# Patient Record
Sex: MALE | Race: WHITE | NOT HISPANIC OR LATINO | Employment: FULL TIME | URBAN - METROPOLITAN AREA
[De-identification: names, ages, dates, MRNs, and addresses within clinical notes are randomized per-mention and may not be internally consistent; named-entity substitution may affect disease eponyms.]

---

## 2017-03-06 ENCOUNTER — TRANSFERRED RECORDS (OUTPATIENT)
Dept: HEALTH INFORMATION MANAGEMENT | Facility: CLINIC | Age: 67
End: 2017-03-06

## 2017-04-07 ENCOUNTER — TRANSFERRED RECORDS (OUTPATIENT)
Dept: HEALTH INFORMATION MANAGEMENT | Facility: CLINIC | Age: 67
End: 2017-04-07

## 2017-05-12 ENCOUNTER — TRANSFERRED RECORDS (OUTPATIENT)
Dept: HEALTH INFORMATION MANAGEMENT | Facility: CLINIC | Age: 67
End: 2017-05-12

## 2017-09-07 ENCOUNTER — TRANSFERRED RECORDS (OUTPATIENT)
Dept: HEALTH INFORMATION MANAGEMENT | Facility: CLINIC | Age: 67
End: 2017-09-07

## 2018-08-02 ENCOUNTER — TRANSFERRED RECORDS (OUTPATIENT)
Dept: HEALTH INFORMATION MANAGEMENT | Facility: CLINIC | Age: 68
End: 2018-08-02

## 2018-10-29 ENCOUNTER — TRANSFERRED RECORDS (OUTPATIENT)
Dept: HEALTH INFORMATION MANAGEMENT | Facility: CLINIC | Age: 68
End: 2018-10-29

## 2020-05-21 ENCOUNTER — TRANSFERRED RECORDS (OUTPATIENT)
Dept: HEALTH INFORMATION MANAGEMENT | Facility: CLINIC | Age: 70
End: 2020-05-21

## 2020-07-09 ENCOUNTER — TRANSFERRED RECORDS (OUTPATIENT)
Dept: HEALTH INFORMATION MANAGEMENT | Facility: CLINIC | Age: 70
End: 2020-07-09

## 2021-07-12 ENCOUNTER — TRANSFERRED RECORDS (OUTPATIENT)
Dept: HEALTH INFORMATION MANAGEMENT | Facility: CLINIC | Age: 71
End: 2021-07-12

## 2022-06-20 ENCOUNTER — TRANSFERRED RECORDS (OUTPATIENT)
Dept: MULTI SPECIALTY CLINIC | Facility: CLINIC | Age: 72
End: 2022-06-20

## 2022-08-24 ENCOUNTER — TRANSFERRED RECORDS (OUTPATIENT)
Dept: HEALTH INFORMATION MANAGEMENT | Facility: CLINIC | Age: 72
End: 2022-08-24

## 2023-05-30 ENCOUNTER — TRANSFERRED RECORDS (OUTPATIENT)
Dept: HEALTH INFORMATION MANAGEMENT | Facility: CLINIC | Age: 73
End: 2023-05-30

## 2023-06-02 ENCOUNTER — TRANSFERRED RECORDS (OUTPATIENT)
Dept: HEALTH INFORMATION MANAGEMENT | Facility: CLINIC | Age: 73
End: 2023-06-02

## 2023-09-07 ENCOUNTER — TRANSFERRED RECORDS (OUTPATIENT)
Dept: HEALTH INFORMATION MANAGEMENT | Facility: CLINIC | Age: 73
End: 2023-09-07

## 2024-02-21 ENCOUNTER — TRANSFERRED RECORDS (OUTPATIENT)
Dept: HEALTH INFORMATION MANAGEMENT | Facility: CLINIC | Age: 74
End: 2024-02-21

## 2024-03-26 ENCOUNTER — TELEPHONE (OUTPATIENT)
Dept: INTERNAL MEDICINE | Facility: CLINIC | Age: 74
End: 2024-03-26
Payer: MEDICARE

## 2024-03-26 NOTE — TELEPHONE ENCOUNTER
Reason for Call:  Appointment Request    Patient requesting this type of appt:  Preventive     Requested provider:  New PCP    Reason patient unable to be scheduled: Not within requested timeframe    When does patient want to be seen/preferred time:  Pt needs new PCP he just moved to [place]    Comments:   Pt found Melissa Garcia and is interested in having her as his PCP Please contact pt to discuss  Okay to leave a detailed message?: Yes at Cell number on file:    Telephone Information:   Mobile [phone]       Call taken on 3/26/2024 at 4:14 PM by Judy Alexander

## 2024-04-02 NOTE — TELEPHONE ENCOUNTER
I spoke to patient, he is aware that Dr. Melissa Garcia only see patient's for memory care. She is no longer a primary care provider

## 2024-05-19 ENCOUNTER — HEALTH MAINTENANCE LETTER (OUTPATIENT)
Age: 74
End: 2024-05-19

## 2024-06-10 ENCOUNTER — OFFICE VISIT (OUTPATIENT)
Dept: FAMILY MEDICINE | Facility: CLINIC | Age: 74
End: 2024-06-10
Payer: MEDICARE

## 2024-06-10 VITALS
SYSTOLIC BLOOD PRESSURE: 148 MMHG | TEMPERATURE: 99 F | RESPIRATION RATE: 16 BRPM | DIASTOLIC BLOOD PRESSURE: 76 MMHG | OXYGEN SATURATION: 96 % | HEIGHT: 69 IN | HEART RATE: 81 BPM | BODY MASS INDEX: 25.77 KG/M2 | WEIGHT: 174 LBS

## 2024-06-10 DIAGNOSIS — I10 ESSENTIAL HYPERTENSION: ICD-10-CM

## 2024-06-10 DIAGNOSIS — Z93.3 COLOSTOMY IN PLACE (H): ICD-10-CM

## 2024-06-10 DIAGNOSIS — R73.03 PREDIABETES: Primary | ICD-10-CM

## 2024-06-10 DIAGNOSIS — F51.01 PRIMARY INSOMNIA: ICD-10-CM

## 2024-06-10 DIAGNOSIS — I25.10 CORONARY ARTERY DISEASE INVOLVING NATIVE CORONARY ARTERY OF NATIVE HEART WITHOUT ANGINA PECTORIS: ICD-10-CM

## 2024-06-10 DIAGNOSIS — K63.5 POLYP OF COLON, UNSPECIFIED PART OF COLON, UNSPECIFIED TYPE: ICD-10-CM

## 2024-06-10 DIAGNOSIS — N52.9 ERECTILE DYSFUNCTION, UNSPECIFIED ERECTILE DYSFUNCTION TYPE: ICD-10-CM

## 2024-06-10 LAB — HBA1C MFR BLD: 5.8 % (ref 0–5.6)

## 2024-06-10 PROCEDURE — 80048 BASIC METABOLIC PNL TOTAL CA: CPT | Performed by: INTERNAL MEDICINE

## 2024-06-10 PROCEDURE — 36415 COLL VENOUS BLD VENIPUNCTURE: CPT | Performed by: INTERNAL MEDICINE

## 2024-06-10 PROCEDURE — 99204 OFFICE O/P NEW MOD 45 MIN: CPT | Performed by: INTERNAL MEDICINE

## 2024-06-10 PROCEDURE — G2211 COMPLEX E/M VISIT ADD ON: HCPCS | Performed by: INTERNAL MEDICINE

## 2024-06-10 PROCEDURE — 80061 LIPID PANEL: CPT | Performed by: INTERNAL MEDICINE

## 2024-06-10 PROCEDURE — 83036 HEMOGLOBIN GLYCOSYLATED A1C: CPT | Performed by: INTERNAL MEDICINE

## 2024-06-10 RX ORDER — UBIDECARENONE 30 MG
1 CAPSULE ORAL DAILY
COMMUNITY

## 2024-06-10 RX ORDER — ELECTROLYTES/DEXTROSE
SOLUTION, ORAL ORAL DAILY
COMMUNITY

## 2024-06-10 RX ORDER — VITAMIN B COMPLEX
1 TABLET ORAL
COMMUNITY

## 2024-06-10 RX ORDER — ATORVASTATIN CALCIUM 40 MG/1
40 TABLET, FILM COATED ORAL DAILY
COMMUNITY

## 2024-06-10 RX ORDER — ASPIRIN 81 MG/1
81 TABLET, CHEWABLE ORAL DAILY
COMMUNITY

## 2024-06-10 RX ORDER — CLOPIDOGREL BISULFATE 75 MG/1
75 TABLET ORAL DAILY
COMMUNITY

## 2024-06-10 RX ORDER — RESPIRATORY SYNCYTIAL VIRUS VACCINE 120MCG/0.5
0.5 KIT INTRAMUSCULAR ONCE
Qty: 1 EACH | Refills: 0 | Status: CANCELLED | OUTPATIENT
Start: 2024-06-10 | End: 2024-06-10

## 2024-06-10 RX ORDER — CHLORTHALIDONE 25 MG/1
25 TABLET ORAL DAILY
COMMUNITY

## 2024-06-10 RX ORDER — SILDENAFIL 100 MG/1
50-100 TABLET, FILM COATED ORAL DAILY PRN
Qty: 30 TABLET | Refills: 0 | Status: SHIPPED | OUTPATIENT
Start: 2024-06-10

## 2024-06-10 RX ORDER — METOPROLOL TARTRATE 25 MG/1
25 TABLET, FILM COATED ORAL ONCE
COMMUNITY
Start: 2023-07-13 | End: 2024-07-22

## 2024-06-10 RX ORDER — TRAZODONE HYDROCHLORIDE 50 MG/1
50 TABLET, FILM COATED ORAL
COMMUNITY
Start: 2023-09-07

## 2024-06-10 ASSESSMENT — PAIN SCALES - GENERAL: PAINLEVEL: NO PAIN (0)

## 2024-06-10 NOTE — PROGRESS NOTES
Assessment & Plan     Pleasant patient who relocated from [facility] just a couple months ago to be closer to family.  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  Here to establish care.  Will place appropriate referrals as well.    He was getting most of his care through [facility].    Prediabetes  By outside records.  Not on medication.  Check an A1c  - Lipid panel reflex to direct LDL Non-fasting  - Hemoglobin A1c  - Basic metabolic panel  (Ca, Cl, CO2, Creat, Gluc, K, Na, BUN)  - Lipid panel reflex to direct LDL Non-fasting  - Hemoglobin A1c  - Basic metabolic panel  (Ca, Cl, CO2, Creat, Gluc, K, Na, BUN)    Primary insomnia  He is on trazodone.  Medications noted.  Refills not requested today.    Essential hypertension  A bit on the high side.  Will check labs.  Check blood pressure at home and report elevated readings.  Return to clinic in 3 months to monitor blood pressure.  - Basic metabolic panel  (Ca, Cl, CO2, Creat, Gluc, K, Na, BUN)  - Basic metabolic panel  (Ca, Cl, CO2, Creat, Gluc, K, Na, BUN)    Coronary artery disease involving native coronary artery of native heart without angina pectoris  Patient had CABG in 2020.  Medications are noted.  Needs to establish with local cardiology.  Referral was entered.  - Lipid panel reflex to direct LDL Non-fasting  - Adult Cardiology aRghavendra Stanford Referral  - Lipid panel reflex to direct LDL Non-fasting    Polyp of colon, unspecified part of colon, unspecified type  He will be due for colonoscopy this fall.  He does have a history of rectal cancer.  Surveillance colonoscopy is indicated.  Orders entered.  - Adult GI  Referral - Procedure Only    Erectile dysfunction, unspecified erectile dysfunction type  He has a significant other [place].  He would like to try sildenafil.  He has had this with success in the past.  Risk and side effects discussed.  - sildenafil (VIAGRA) 100 MG tablet  Dispense: 30 tablet; Refill: 0    Ostomy:  He  "has an ileostomy.  He made a personal decision to keep the ileostomy in place due to his career and needing to use the bathroom every so often.  He is now cut back on work a little bit.  He may be interested in pursuing ileostomy takedown at some point in the future.        49 minutes spent by me on the date of the encounter doing chart review, review of outside records, interpretation of tests, patient visit, and documentation     BMI  Estimated body mass index is 25.7 kg/m  as calculated from the following:    Height as of this encounter: 1.753 m (5' 9\").    Weight as of this encounter: 78.9 kg (174 lb).           Ro Ramirez is a 74 year old, presenting for the following health issues:  Establish Care (Recently moved from [place], [place] seen at [facility] )        6/10/2024    12:59 PM   Additional Questions   Roomed by Vida     Via the Health Maintenance questionnaire, the patient has reported the following services have been completed -Colonscopy: [place], [place]. 2022-05-15, this information has been sent to the abstraction team.    Pt is new to me as well as the system.  Recently relocated from [facility].  Son in Law is an orthopedic Surgeon at [employer]. Came here to be closer to family.      CAD:  Had CABG in 7/2020.    Medications noted.     Cancer:  Rectal.  Was seeing Dr. Tomas Lou at [facility] in [place].  Dx 2017.  Had chemoradiation.  Has ostomy. Was told occasional CEA lab for the next 1-2 years but nothing after or in addition.      He has ileostomy.  Has chosen to keep it as he is in the music business and felt that frequent BM were not conducive to his work life.      Colon polyps:  On C scope fall 2023.          History of Present Illness       Vascular Disease:  He presents for follow up of vascular disease.     He never takes nitroglycerin. He takes daily aspirin.    He eats 2-3 servings of fruits and vegetables daily.He consumes 0 sweetened beverage(s) daily.He " "exercises with enough effort to increase his heart rate 20 to 29 minutes per day.  He exercises with enough effort to increase his heart rate 4 days per week.   He is taking medications regularly.             Objective    BP (!) 148/76   Pulse 81   Temp 99  F (37.2  C) (Temporal)   Resp 16   Ht 1.753 m (5' 9\")   Wt 78.9 kg (174 lb)   SpO2 96%   BMI 25.70 kg/m    Body mass index is 25.7 kg/m .  Physical Exam   GENERAL: alert and no distress  NECK: no adenopathy, no asymmetry, masses, or scars  RESP: lungs clear to auscultation - no rales, rhonchi or wheezes  CV: regular rate and rhythm, normal S1 S2, no S3 or S4, no murmur, click or rub, no peripheral edema  ABDOMEN: soft, nontender, no hepatosplenomegaly, no masses and bowel sounds normal  MS: no gross musculoskeletal defects noted, no edema            Signed Electronically by: Timur Bustos MD    "

## 2024-06-10 NOTE — PATIENT INSTRUCTIONS
LABS now    We placed a referral to Cardiology to establish care.  They will call you to schedule.    We placed a referral for a Colonoscopy.  They'll call you.  Get it in November    See me in 3 months

## 2024-06-11 LAB
ANION GAP SERPL CALCULATED.3IONS-SCNC: 11 MMOL/L (ref 7–15)
BUN SERPL-MCNC: 15.2 MG/DL (ref 8–23)
CALCIUM SERPL-MCNC: 9.9 MG/DL (ref 8.8–10.2)
CHLORIDE SERPL-SCNC: 97 MMOL/L (ref 98–107)
CHOLEST SERPL-MCNC: 201 MG/DL
CREAT SERPL-MCNC: 0.81 MG/DL (ref 0.67–1.17)
DEPRECATED HCO3 PLAS-SCNC: 27 MMOL/L (ref 22–29)
EGFRCR SERPLBLD CKD-EPI 2021: >90 ML/MIN/1.73M2
FASTING STATUS PATIENT QL REPORTED: NO
FASTING STATUS PATIENT QL REPORTED: NO
GLUCOSE SERPL-MCNC: 76 MG/DL (ref 70–99)
HDLC SERPL-MCNC: 62 MG/DL
LDLC SERPL CALC-MCNC: 82 MG/DL
NONHDLC SERPL-MCNC: 139 MG/DL
POTASSIUM SERPL-SCNC: 3.6 MMOL/L (ref 3.4–5.3)
SODIUM SERPL-SCNC: 135 MMOL/L (ref 135–145)
TRIGL SERPL-MCNC: 286 MG/DL

## 2024-06-18 ENCOUNTER — TELEPHONE (OUTPATIENT)
Dept: GASTROENTEROLOGY | Facility: CLINIC | Age: 74
End: 2024-06-18
Payer: MEDICARE

## 2024-06-18 NOTE — TELEPHONE ENCOUNTER
"Endoscopy Scheduling Screen    Have you had a positive Covid test in the last 14 days?  No      What is your communication preference for Instructions and/or Bowel Prep?   MyChart -  & EMAIL WHEN POSSIBLE      What insurance is in the chart?  Other:  MEDICARE/AARP    Ordering/Referring Provider: HAY PACE   (If ordering provider performs procedure, schedule with ordering provider unless otherwise instructed. )    BMI: Estimated body mass index is 25.7 kg/m  as calculated from the following:    Height as of 6/10/24: 1.753 m (5' 9\").    Weight as of 6/10/24: 78.9 kg (174 lb).     Sedation Ordered  moderate sedation.   If patient BMI > 50 do not schedule in [facility].    If patient BMI > 45 do not schedule at [facility].    Are you taking methadone or Suboxone?  No    Have you had difficulties, pain, or discomfort during past endoscopy procedures? NORMALLY NOT SEDATED   No    Are you taking any prescription medications for pain 3 or more times per week?   NO, No RN review required.      Do you have a history of malignant hyperthermia?  No      (Females) Are you currently pregnant?          Have you been diagnosed or told you have pulmonary hypertension?   No      Do you have an LVAD?  No      Have you been told you have moderate to severe sleep apnea?  No    Have you been told you have COPD, asthma, or any other lung disease?  No      Do you have any heart conditions?  Yes -  HX of bypass    In the past year, have you had any hospitalizations for heart related issues including cardiomyopathy, heart attack, or stent placement?  No    Do you have any implantable devices in your body (pacemaker, ICD)?  No    Do you take nitroglycerine?  No      Have you ever had or are you waiting for an organ transplant?  No. Continue scheduling, no site restrictions.      Have you had a stroke or transient ischemic attack (TIA aka \"mini stroke\" in the last 6 months?   No      Have you been diagnosed with or been told you have cirrhosis of the " "liver?   No      Are you currently on dialysis?   No      Do you need assistance transferring?   No    BMI: Estimated body mass index is 25.7 kg/m  as calculated from the following:    Height as of 6/10/24: 1.753 m (5' 9\").    Weight as of 6/10/24: 78.9 kg (174 lb).     Is patients BMI > 40 and scheduling location UP?  No      Do you take an injectable medication for weight loss or diabetes (excluding insulin)?  No    Do you take the medication Naltrexone?  No    Do you take blood thinners?  Yes      Are you taking Effient/Prasugrel?  No, you must contact your prescribing provider for direction on holding or bridging with a different medication.       Prep   Are you currently on dialysis or do you have chronic kidney disease?  No    Do you have a diagnosis of diabetes?  No    Do you have a diagnosis of cystic fibrosis (CF)?  No    On a regular basis do you go 3 -5 days between bowel movements?  No    BMI > 40?  No    Preferred Pharmacy:    [id number] [address] [place] [address] [place] [address]  Phone: [phone] Fax: [phone]      Final Scheduling Details     Procedure scheduled  Colonoscopy    Surgeon:  RAMON     Date of procedure:  9/17/24     Pre-OP / PAC:   No - Not required for this site.    Location  SH - Patient preference.    Sedation   No Sedation - Patient preference.      Patient Reminders:   You will receive a call from a Nurse to review instructions and health history.  This assessment must be completed prior to your procedure.  Failure to complete the Nurse assessment may result in the procedure being cancelled.      On the day of your procedure, please designate an adult(s) who can drive you home stay with you for the next 24 hours. The medicines used in the exam will make you sleepy. You will not be able to drive.      You cannot take public transportation, ride share services, or non-medical taxi service without a " responsible caregiver.  Medical transport services are allowed with the requirement that a responsible caregiver will receive you at your destination.  We require that drivers and caregivers are confirmed prior to your procedure.

## 2024-06-20 ENCOUNTER — TRANSFERRED RECORDS (OUTPATIENT)
Dept: HEALTH INFORMATION MANAGEMENT | Facility: CLINIC | Age: 74
End: 2024-06-20
Payer: MEDICARE

## 2024-07-08 ENCOUNTER — MYC MEDICAL ADVICE (OUTPATIENT)
Dept: FAMILY MEDICINE | Facility: CLINIC | Age: 74
End: 2024-07-08
Payer: MEDICARE

## 2024-07-08 DIAGNOSIS — Z93.3 COLOSTOMY IN PLACE (H): Primary | ICD-10-CM

## 2024-07-08 NOTE — TELEPHONE ENCOUNTER
TO PCP:     See below, pt asking for your advice regarding having ileostomy reversal procedure done     Please advise     Elisha PEREYRA, Triage RN  [facility]

## 2024-07-12 NOTE — TELEPHONE ENCOUNTER
Diagnosis, Referred by & from: Discuss Ileostomy Takedown   Appt date: 9/24/2024   NOTES STATUS DETAILS   OFFICE NOTE from referring provider Internal [place] [facility] [place]:  7/8/24 - MyChart referral from Dr. Bustos  6/10/24 - PCC OV with Dr. Bustos   OFFICE NOTE from other specialist Care Everywhere [facility]:  2/21/24, 2/22/23 - ONC OV with Dr. Lou  3/2/23 - GI OV with Dr. Heart  7/9/20 - CR OV with Dr. Cohen  9/6/17 - WOUND OV with Raman Cian RN  7/12/17 - RAD ONC OV with Dr. Pepe   DISCHARGE SUMMARY from hospital Care Everywhere [facility]:  8/31/17 - Admission with Dr. Day   DISCHARGE REPORT from the ER N/A    OPERATIVE REPORT Care [facility]where [facility]:  8/31/17 - OP Note for Robot assisted lap Low anterior resection with end colosotomy (Leander's) and flexible sigmoidoscopy with Dr. Cohen ([facility])   MEDICATION LIST Internal    LABS     ANAL PAP/CEA Care Everywhere [facility]:  2/21/24 - CEA   BIOPSIES/PATHOLOGY RELATED TO DIAGNOSIS Care [facility]where [facility]:  6/20/22 - Colon Biopsy (Case: [id number])  8/31/17 - Rectum (Case: [id number])  4/11/17 - Rectal (case: [id number])  3/6/17 - Colon Biopsy (Case: [id number])   DIAGNOSTIC PROCEDURES     PFC TESTING (from the Pelvic floor center includes Manometry, PDNL, EMG, etc.) N/A    COLONOSCOPY (most recent all time after 5 years) Care [facility]where [facility]:  ([facility]) 9/17/24 - Colonoscopy    [facility]:  6/20/22 - Colonoscopy  10/29/18 - Colonoscopy  3/6/17 - Colonoscopy   FLEX SIGMOIDOSCOPY Care Everywhere [facility]:  7/17/17 - Flexible Sigmoidoscopy   UPPER ENDOSCOPY (EGD) Care [facility]where [facility]:  5/30/23 - EGD   ERCP N/A    IMAGING (DISC & REPORT)      CT Received [facility]:  8/24/22 - CT Chest/Abd/pelvis  7/12/21 - CT Chest/Abd/Pelvis  5/21/20 - CT Chest/Abd/pelvis  8/28/18 - CT Chest/Abd/Pelvis   MRI N/A    XRAY N/A    ULTRASOUND  (ENDOANAL/ENDORECTAL) N/A      Records Requested  07/12/24     Facility  [facility]  Fax: [phone]   Outcome * 24 11:03 AM Faxed request to [facility] for imaging disc to be fed'exd to the clinic. - [facility]    * 24 7:07 AM Imaging disc received from [facility] and sent to [facility] include 234 beds at the [facility] to be uploaded into PACs. - [place]    * 24 1:15 PM Imaging disc received from [facility] and sent to [facility] include 234 beds at the [facility] to be uploaded into PACs. - [facility]    Trackin

## 2024-07-16 ENCOUNTER — OFFICE VISIT (OUTPATIENT)
Dept: CARDIOLOGY | Facility: CLINIC | Age: 74
End: 2024-07-16
Attending: INTERNAL MEDICINE
Payer: MEDICARE

## 2024-07-16 VITALS
DIASTOLIC BLOOD PRESSURE: 83 MMHG | HEART RATE: 68 BPM | SYSTOLIC BLOOD PRESSURE: 137 MMHG | OXYGEN SATURATION: 95 % | HEIGHT: 69 IN | WEIGHT: 174.3 LBS | BODY MASS INDEX: 25.82 KG/M2

## 2024-07-16 DIAGNOSIS — I25.10 CORONARY ARTERY DISEASE INVOLVING NATIVE CORONARY ARTERY OF NATIVE HEART WITHOUT ANGINA PECTORIS: ICD-10-CM

## 2024-07-16 PROCEDURE — 99204 OFFICE O/P NEW MOD 45 MIN: CPT | Performed by: INTERNAL MEDICINE

## 2024-07-16 PROCEDURE — 93000 ELECTROCARDIOGRAM COMPLETE: CPT | Performed by: INTERNAL MEDICINE

## 2024-07-16 NOTE — LETTER
7/16/2024    Timur Bustos MD  [address] [place] [address]    RE: James Kebede       Dear Colleague,     I had the pleasure of seeing James Kebede in the [facility].  HPI and Plan:   James Kebede is a 74 year old male who presents with history of coronary artery disease and four-vessel CABG in 2020 in [facility].  He had a LIMA graft to an LAD, vein graft to OM1 and sequential to OM 2, and a vein graft to diagonal branch.  He has preserved LV systolic function on echo last year EF was 69% with some mild left ventricular hypertrophy noted no significant valve disease.  He has recently moved to [place], although he travels a lot for his job.  He is here to establish care.  Sounds like he had significant chest discomfort with his heart attack prior to his bypass.  He has not had any chest discomfort since and he is stays very active and enjoys bicycling for exercise.  He is also had colon cancer and had a partial colectomy, and has an ileostomy.  He tells me his oncologist has discharged him cancer free.  He had blood work drawn in June including a hemoglobin A1c demonstrating borderline diabetes at 5.8, basic metabolic panel was normal and total cholesterol 201 HDL 62 LDL 82 and triglycerides 286 on moderate intensity atorvastatin.    Exam today is unremarkable    Summary    1.  Coronary artery disease with previous four-vessel CABG and preserved LV systolic function-he remains asymptomatic on optimal medical therapy, regular aerobic exercise and maintains a vegetarian diet.  He is on dual antiplatelet therapy because of history of large and ectatic coronary vessels.  I do agree with this however we do need to be cautious with long-term DAPT, which increases bleeding risks.    2.  Hyperlipidemia-she still has mild hypertriglyceridemia despite moderate intensity atorvastatin diet regular aerobic exercise, weight maintenance and avoidance of highly processed, sugary or  fatty foods and alcohol recommended.    3.  Borderline diabetes-diet controlled at this time, continue to work with PMD on blood sugar control    Please feel free to contact me with any questions given regards to his care         Today's clinic visit entailed:  Review of external notes as documented elsewhere in note  Review of the result(s) of each unique test - BMP, lipids, CBC, HgbA1c    Provider  Link to MDM Help Grid     The level of medical decision making during this visit was of moderate complexity.    Orders Placed This Encounter   Procedures    EKG 12-lead complete w/read - Clinics (performed today)     No orders of the defined types were placed in this encounter.    There are no discontinued medications.      Encounter Diagnosis   Name Primary?    Coronary artery disease involving native coronary artery of native heart without angina pectoris        CURRENT MEDICATIONS:  Current Outpatient Medications   Medication Sig Dispense Refill    aspirin (ASA) 81 MG chewable tablet Take 81 mg by mouth daily      atorvastatin (LIPITOR) 40 MG tablet Take 40 mg by mouth daily      chlorthalidone (HYGROTON) 25 MG tablet Take 25 mg by mouth daily      clopidogrel (PLAVIX) 75 MG tablet Take 75 mg by mouth daily      coenzyme Q-10 capsule Take 1 capsule by mouth daily      metoprolol tartrate (LOPRESSOR) 25 MG tablet Take 25 mg by mouth once      Multiple Vitamin (MULTIVITAMIN ADULT) TABS daily      sildenafil (VIAGRA) 100 MG tablet Take 0.5-1 tablets ( mg) by mouth daily as needed (prior to intercourse) 30 tablet 0    traZODone (DESYREL) 50 MG tablet Take 50 mg by mouth nightly as needed      vitamin B complex with vitamin C (VITAMIN  B COMPLEX) tablet Take 1 tablet by mouth twice a week      Vitamin D3 (CHOLECALCIFEROL) 25 mcg (1000 units) tablet Take 1 tablet by mouth twice a week         ALLERGIES   No Known Allergies    PAST MEDICAL HISTORY:  History reviewed. No pertinent past medical history.    PAST  SURGICAL HISTORY:  History reviewed. No pertinent surgical history.    FAMILY HISTORY:  History reviewed. No pertinent family history.    SOCIAL HISTORY:  Social History     Socioeconomic History    Marital status: Single     Spouse name: None    Number of children: None    Years of education: None    Highest education level: None   Tobacco Use    Smoking status: Former     Types: Cigarettes    Smokeless tobacco: Never   Vaping Use    Vaping status: Never Used   Substance and Sexual Activity    Alcohol use: Not Currently     Social Determinants of Health     Financial Resource Strain: Low Risk  (6/10/2024)    Financial Resource Strain     Within the past 12 months, have you or your family members you live with been unable to get utilities (heat, electricity) when it was really needed?: No   Food Insecurity: Low Risk  (6/10/2024)    Food Insecurity     Within the past 12 months, did you worry that your food would run out before you got money to buy more?: No     Within the past 12 months, did the food you bought just not last and you didn t have money to get more?: No   Transportation Needs: Low Risk  (6/10/2024)    Transportation Needs     Within the past 12 months, has lack of transportation kept you from medical appointments, getting your medicines, non-medical meetings or appointments, work, or from getting things that you need?: No   Physical Activity: Sufficiently Active (9/6/2022)    Received from [facility]    Exercise Vital Sign     Days of Exercise per Week: 7 days     Minutes of Exercise per Session: 150+ min   Stress: No Stress Concern Present (9/6/2022)    Received from [facility] - Occupational Stress Questionnaire     Feeling of Stress : Not at all   Interpersonal Safety: Low Risk  (6/10/2024)    Interpersonal Safety     Do you feel physically and emotionally safe where you  "currently live?: Yes     Within the past 12 months, have you been hit, slapped, kicked or otherwise physically hurt by someone?: No     Within the past 12 months, have you been humiliated or emotionally abused in other ways by your partner or ex-partner?: No   Housing Stability: Low Risk  (6/10/2024)    Housing Stability     Do you have housing? : Yes     Are you worried about losing your housing?: No       Review of Systems:  Skin:        Eyes:       ENT:       Respiratory:  Positive for shortness of breath;dyspnea on exertion  Cardiovascular:  Negative for;palpitations;chest pain Positive for;fatigue  Gastroenterology:      Genitourinary:       Musculoskeletal:       Neurologic:       Psychiatric:       Heme/Lymph/Imm:       Endocrine:         Physical Exam:  Vitals: /83 (BP Location: Left arm, Patient Position: Sitting)   Pulse 68   Ht 1.753 m (5' 9\")   Wt 79.1 kg (174 lb 4.8 oz)   SpO2 95%   BMI 25.74 kg/m      Constitutional:  cooperative;in no acute distress        Skin:  warm and dry to the touch          Head:  normocephalic        Eyes:  pupils equal and round        Lymph:      ENT:  no pallor or cyanosis        Neck:  no carotid bruit        Respiratory:  clear to auscultation         Cardiac: regular rhythm;no murmurs, gallops or rubs detected                pulses full and equal                                        GI:  abdomen soft        Extremities and Muscular Skeletal:  no edema;no deformities, clubbing, cyanosis, erythema observed              Neurological:  no gross motor deficits;affect appropriate        Psych:  Alert and Oriented x 3        Recent Lab Results:  LIPID RESULTS:  Lab Results   Component Value Date    CHOL 201 (H) 06/10/2024    HDL 62 06/10/2024    LDL 82 06/10/2024    TRIG 286 (H) 06/10/2024       LIVER ENZYME RESULTS:  No results found for: \"AST\", \"ALT\"    CBC RESULTS:  No results found for: \"WBC\", \"RBC\", \"HGB\", \"HCT\", \"MCV\", \"MCH\", \"MCHC\", \"RDW\", \"PLT\"    BMP " "RESULTS:  Lab Results   Component Value Date     06/10/2024    POTASSIUM 3.6 06/10/2024    CHLORIDE 97 (L) 06/10/2024    CO2 27 06/10/2024    ANIONGAP 11 06/10/2024    GLC 76 06/10/2024    BUN 15.2 06/10/2024    CR 0.81 06/10/2024    GFRESTIMATED >90 06/10/2024    BHAKTI 9.9 06/10/2024        A1C RESULTS:  Lab Results   Component Value Date    A1C 5.8 (H) 06/10/2024       INR RESULTS:  No results found for: \"INR\"        CC  Timur Bustos MD  [address] [place] [address]      Thank you for allowing me to participate in the care of your patient.      Sincerely,     Miriam Bar, [facility]  "

## 2024-07-16 NOTE — PROGRESS NOTES
HPI and Plan:   James Kebede is a 74 year old male who presents with history of coronary artery disease and four-vessel CABG in 2020 in [facility].  He had a LIMA graft to an LAD, vein graft to OM1 and sequential to OM 2, and a vein graft to diagonal branch.  He has preserved LV systolic function on echo last year EF was 69% with some mild left ventricular hypertrophy noted no significant valve disease.  He has recently moved to [place], although he travels a lot for his job.  He is here to establish care.  Sounds like he had significant chest discomfort with his heart attack prior to his bypass.  He has not had any chest discomfort since and he is stays very active and enjoys bicycling for exercise.  He is also had colon cancer and had a partial colectomy, and has an ileostomy.  He tells me his oncologist has discharged him cancer free.  He had blood work drawn in June including a hemoglobin A1c demonstrating borderline diabetes at 5.8, basic metabolic panel was normal and total cholesterol 201 HDL 62 LDL 82 and triglycerides 286 on moderate intensity atorvastatin.    Exam today is unremarkable    Summary    1.  Coronary artery disease with previous four-vessel CABG and preserved LV systolic function-he remains asymptomatic on optimal medical therapy, regular aerobic exercise and maintains a vegetarian diet.  He is on dual antiplatelet therapy because of history of large and ectatic coronary vessels.  I do agree with this however we do need to be cautious with long-term DAPT, which increases bleeding risks.    2.  Hyperlipidemia-she still has mild hypertriglyceridemia despite moderate intensity atorvastatin diet regular aerobic exercise, weight maintenance and avoidance of highly processed, sugary or fatty foods and alcohol recommended.    3.  Borderline diabetes-diet controlled at this time, continue to work with PMD on blood sugar control    Please feel free to contact me with any questions given  regards to his care         Today's clinic visit entailed:  Review of external notes as documented elsewhere in note  Review of the result(s) of each unique test - BMP, lipids, CBC, HgbA1c    Provider  Link to MDM Help Grid     The level of medical decision making during this visit was of moderate complexity.    Orders Placed This Encounter   Procedures    EKG 12-lead complete w/read - Clinics (performed today)     No orders of the defined types were placed in this encounter.    There are no discontinued medications.      Encounter Diagnosis   Name Primary?    Coronary artery disease involving native coronary artery of native heart without angina pectoris        CURRENT MEDICATIONS:  Current Outpatient Medications   Medication Sig Dispense Refill    aspirin (ASA) 81 MG chewable tablet Take 81 mg by mouth daily      atorvastatin (LIPITOR) 40 MG tablet Take 40 mg by mouth daily      chlorthalidone (HYGROTON) 25 MG tablet Take 25 mg by mouth daily      clopidogrel (PLAVIX) 75 MG tablet Take 75 mg by mouth daily      coenzyme Q-10 capsule Take 1 capsule by mouth daily      metoprolol tartrate (LOPRESSOR) 25 MG tablet Take 25 mg by mouth once      Multiple Vitamin (MULTIVITAMIN ADULT) TABS daily      sildenafil (VIAGRA) 100 MG tablet Take 0.5-1 tablets ( mg) by mouth daily as needed (prior to intercourse) 30 tablet 0    traZODone (DESYREL) 50 MG tablet Take 50 mg by mouth nightly as needed      vitamin B complex with vitamin C (VITAMIN  B COMPLEX) tablet Take 1 tablet by mouth twice a week      Vitamin D3 (CHOLECALCIFEROL) 25 mcg (1000 units) tablet Take 1 tablet by mouth twice a week         ALLERGIES   No Known Allergies    PAST MEDICAL HISTORY:  History reviewed. No pertinent past medical history.    PAST SURGICAL HISTORY:  History reviewed. No pertinent surgical history.    FAMILY HISTORY:  History reviewed. No pertinent family history.    SOCIAL HISTORY:  Social History     Socioeconomic History    Marital  status: Single     Spouse name: None    Number of children: None    Years of education: None    Highest education level: None   Tobacco Use    Smoking status: Former     Types: Cigarettes    Smokeless tobacco: Never   Vaping Use    Vaping status: Never Used   Substance and Sexual Activity    Alcohol use: Not Currently     Social Determinants of Health     Financial Resource Strain: Low Risk  (6/10/2024)    Financial Resource Strain     Within the past 12 months, have you or your family members you live with been unable to get utilities (heat, electricity) when it was really needed?: No   Food Insecurity: Low Risk  (6/10/2024)    Food Insecurity     Within the past 12 months, did you worry that your food would run out before you got money to buy more?: No     Within the past 12 months, did the food you bought just not last and you didn t have money to get more?: No   Transportation Needs: Low Risk  (6/10/2024)    Transportation Needs     Within the past 12 months, has lack of transportation kept you from medical appointments, getting your medicines, non-medical meetings or appointments, work, or from getting things that you need?: No   Physical Activity: Sufficiently Active (9/6/2022)    Received from [facility]    Exercise Vital Sign     Days of Exercise per Week: 7 days     Minutes of Exercise per Session: 150+ min   Stress: No Stress Concern Present (9/6/2022)    Received from [facility]    [employer] - Occupational Stress Questionnaire     Feeling of Stress : Not at all   Interpersonal Safety: Low Risk  (6/10/2024)    Interpersonal Safety     Do you feel physically and emotionally safe where you currently live?: Yes     Within the past 12 months, have you been hit, slapped, kicked or otherwise physically hurt by someone?: No     Within the past 12 months, have you been humiliated or emotionally  "abused in other ways by your partner or ex-partner?: No   Housing Stability: Low Risk  (6/10/2024)    Housing Stability     Do you have housing? : Yes     Are you worried about losing your housing?: No       Review of Systems:  Skin:        Eyes:       ENT:       Respiratory:  Positive for shortness of breath;dyspnea on exertion  Cardiovascular:  Negative for;palpitations;chest pain Positive for;fatigue  Gastroenterology:      Genitourinary:       Musculoskeletal:       Neurologic:       Psychiatric:       Heme/Lymph/Imm:       Endocrine:         Physical Exam:  Vitals: /83 (BP Location: Left arm, Patient Position: Sitting)   Pulse 68   Ht 1.753 m (5' 9\")   Wt 79.1 kg (174 lb 4.8 oz)   SpO2 95%   BMI 25.74 kg/m      Constitutional:  cooperative;in no acute distress        Skin:  warm and dry to the touch          Head:  normocephalic        Eyes:  pupils equal and round        Lymph:      ENT:  no pallor or cyanosis        Neck:  no carotid bruit        Respiratory:  clear to auscultation         Cardiac: regular rhythm;no murmurs, gallops or rubs detected                pulses full and equal                                        GI:  abdomen soft        Extremities and Muscular Skeletal:  no edema;no deformities, clubbing, cyanosis, erythema observed              Neurological:  no gross motor deficits;affect appropriate        Psych:  Alert and Oriented x 3        Recent Lab Results:  LIPID RESULTS:  Lab Results   Component Value Date    CHOL 201 (H) 06/10/2024    HDL 62 06/10/2024    LDL 82 06/10/2024    TRIG 286 (H) 06/10/2024       LIVER ENZYME RESULTS:  No results found for: \"AST\", \"ALT\"    CBC RESULTS:  No results found for: \"WBC\", \"RBC\", \"HGB\", \"HCT\", \"MCV\", \"MCH\", \"MCHC\", \"RDW\", \"PLT\"    BMP RESULTS:  Lab Results   Component Value Date     06/10/2024    POTASSIUM 3.6 06/10/2024    CHLORIDE 97 (L) 06/10/2024    CO2 27 06/10/2024    ANIONGAP 11 06/10/2024    GLC 76 06/10/2024    BUN 15.2 " "06/10/2024    CR 0.81 06/10/2024    GFRESTIMATED >90 06/10/2024    BHAKTI 9.9 06/10/2024        A1C RESULTS:  Lab Results   Component Value Date    A1C 5.8 (H) 06/10/2024       INR RESULTS:  No results found for: \"INR\"        CC  Timur Bustos MD  [id number] [id number]                "

## 2024-07-19 ENCOUNTER — TELEPHONE (OUTPATIENT)
Dept: CARDIOLOGY | Facility: CLINIC | Age: 74
End: 2024-07-19
Payer: MEDICARE

## 2024-07-19 DIAGNOSIS — I10 ESSENTIAL HYPERTENSION: Primary | ICD-10-CM

## 2024-07-19 NOTE — TELEPHONE ENCOUNTER
Patient seen in office on 7/16/24.  Patient recently moved here from [place], past cardiologist prescribed him Metoprolol 25 mg daily.  Patient has been out of this medication for quite some time but is now wondering if he should be taking this due to his elevated BP's.  Will route to provider to advise  Waleska Romo RN on 7/19/2024 at 9:43 AM

## 2024-07-19 NOTE — TELEPHONE ENCOUNTER
Health Call Center    Phone Message    May a detailed message be left on voicemail: yes     Reason for Call: Medication Question or concern regarding medication   Prescription Clarification  Name of Medication: metoprolol tartrate (LOPRESSOR) 25 MG tablet   Prescribing Provider: Dr. Bar   Pharmacy:    What on the order needs clarification? Patient is wondering if he is supposed to still be on this medication. Please call the patient back to discuss.       Action Taken: Other: cardiology    Travel Screening: Not Applicable  Thank you!  [facility]       Date of Service:

## 2024-07-22 RX ORDER — METOPROLOL TARTRATE 25 MG/1
25 TABLET, FILM COATED ORAL DAILY
Qty: 90 TABLET | Refills: 3 | Status: SHIPPED | OUTPATIENT
Start: 2024-07-22

## 2024-07-22 NOTE — TELEPHONE ENCOUNTER
Miriam Bar DO Lidke, Jen M, RN  Caller: Unspecified (3 days ago,  9:08 AM)  Yes, start metoprolol 25mg daily    Returned call to patient and discussed recommendation.  Patient verbalized understanding.  Waleska Romo, KIKA on 7/22/2024 at 12:04 PM

## 2024-09-09 ENCOUNTER — TELEPHONE (OUTPATIENT)
Dept: GASTROENTEROLOGY | Facility: CLINIC | Age: 74
End: 2024-09-09
Payer: MEDICARE

## 2024-09-09 NOTE — TELEPHONE ENCOUNTER
Pre visit planning completed.      Procedure details:    Patient scheduled for Colonoscopy on 9/17/24.     Arrival time: 1215. Procedure time 1300    Facility location: [facility]; [facility] [address]. Check in location: 1st Floor [facility].     Sedation type: none     Pre op exam needed? No.    Indication for procedure:   Polyp of colon, unspecified part of colon, unspecified type [K63.5]            Chart review:     Electronic implanted devices? No    Recent diagnosis of diverticulitis within the last 6 weeks? No      Medication review:    Diabetic? No    Anticoagulants? Yes Clopidogrel (Plavix): Recommended HOLD 5 days before procedure.  Consult with your managing provider.    Weight loss medication/injectable? No.    NSAIDS? No    Other medication HOLDING recommendations:  N/A      Prep for procedure:     Bowel prep recommendation: Standard Miralax  Due to: standard bowel prep.    Prep instructions sent via [facility] 8/27/24         Arely Garduno RN  Endoscopy Procedure Pre Assessment RN  [phone] option 4

## 2024-09-10 NOTE — TELEPHONE ENCOUNTER
Pre assessment completed for upcoming procedure.   (Please see previous telephone encounter notes for complete details)    Procedure details:    Arrival time and facility location reviewed.    Pre op exam needed? No.    Designated  policy reviewed. Pt is not having sedation.     Medication review:    Medications reviewed. Please see supporting documentation below. Holding recommendations discussed (if applicable).   Blood thinner/Anti-platelet medication(s):  Clopidogrel (Plavix): Recommended HOLD 5 days before procedure.  Consult with your managing provider.      Prep for procedure:     Procedure prep instructions reviewed.  Patient stated they [facility] Writer answered all patient questions (if applicable). NPO instructions reviewed.    Patient was instructed to call if any questions or concerns arise.        Any additional information needed:  N/A      Patient  verbalized understanding and had no questions or concerns at this time.      Arely Garduno RN  [phone] option 2

## 2024-09-12 NOTE — PROGRESS NOTES
Colon and Rectal Surgery Clinic Note    RE: James Kebede.  : 1950.  RODRIGUEZ: 2024.    Reason for visit: colostomy takedown.    HPI: James Kebede is a 74 year old male who presents today to discuss a colostomy takedown. 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 Tomas Lou in [employer]. Surgical Pathology demonstrated no residual disease, ypT0N0. See pathology comment in red below. Most recent CT Chest/Abdomen/Pelvis in  demonstrated no recurrent disease. CEA in 2024 was 3.2 (baseline 4.0). Colonoscopy on 2024 demonstrated three juvenile/inflammatory polyps in the ascending colon. 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      Today James feels fine. 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 He is on 81mg of Asprin and Plavix.    Robotic LAR with end colostomy (2017):  Description of Procedure  After appropriate consent including risks, benefits and alternatives, patient was brought to the OR, and placed in lithotomy position. Anesthesia was administered.  All melly prominences were padded, antibiotics were given, and scds were placed.   The patient was prepped and drapped in usual sterile fashion.  A 12mm port visiport was placed on the left upper quadrant as out assistant port. . The abdomen was insufflated to 15mm Hg.   The laparoscope was inserted and confirmed no intra-abdominal contents were injured upon entry.  The abdomen was explored laparoscopically.   A 8 mm robotic port was placed in the right paraumbilical region  under direct vision of the laparoscope after instilling local and making an incision.An 8mm robotic port was periumbilically at our future colostomy site  A 8 mm was placed in the RLQ and was placed under direct vision of the laparoscope (after instillation of local)   The robot was docked  The left colon was mobilized off the retroperitoneum using monopolar scissors in a medial to lateral technique. The inferior mesenteric artery and vein were isolated and divided an vessel sealer device . The dissection was carried laterally to the abdominal wall and cephalad to near the splenic flexure. The ureter was identified prior to division and confirmed to be out of the way.  The lateral peritoneal attachments were then incised with vessel sealer   The dissection was carried down toward the pelvis.   A flexible sigmoidoscopy was performed to confirm the level of the ink and tumor. A endo PERLA 60mm purple load was fired distal to the ink an additional 2 45mm staple firings were necessary, this was visualized on the sigmoidoscope. The pelvis was irrigated and found to be hemostatic.   The robot was then undocked from the patient.  A circular inscion was made around the port site and carried to the level of the anterior rectus. The fascia was entered vertically, the rectus was split and the posterior rectus fascia was opened. A small Lemuel wound retractor was then inserted in the 8mm robotic port after the specimen was grasped.   The sigmoid colon was brought out of the abdomen an additional .The incisions were copiously irrigated and 40 monycryl was used in a running fashion to close skin. An 2-0 prolene was used in a figure 8 fashion to approximate fascia around the stoma site  The mesentery was then divided in a clamp and tied with 0 silk to this region of the bowel. Endo PERLA stapler was used to transect the proximal bowel. The staple line was then divided with electrocautery and the colostomy was Brooked with 3-O vicryl.  Dermabond and the ostomy appliance were applied.     Surgical Pathology (8/31/2024):  FINAL DIAGNOSIS:     Rectum (22.5 cm in length), resection:   History of mucinous adenocarcinoma of the rectum, status-post   neoadjuvant treatment with no residual viable tumor; please see   [id number] and [id number] for pre-treatment protocol elements:   Tumor site: Rectum.   Tumor size: No residual invasive carcinoma after presurgical   (neoadjuvant) therapy.   Microscopic depth/extent of invasion: See comment.   Gross evaluation of mesorectum: Complete (score 1).   Tumor deposits: See comment.   Microsatellite instability (MMR protein expression status): Negative for   MSI-H (see [id number]).   Treatment effect: Complete tumor response, score 0.   Regional lymph nodes:   Total lymph nodes identified: 19.   Lymph nodes with metastases: Zero (0/19),   Margins: Negative for tumor   Pathologic stage (AJCC 7th ed.):  ypT0 ypN0   Distant metastasis: Not applicable.   Additional findings: Tattoo ink is identified distal to the main mas   s   near the distal mucosal margin..   __ __ __   COMMENT:   There are large mucin pools within the rectal wall and extending into   perirectal adipose tissue, adjacent to the deep margin and within the   node bearing adipose tissue, suggesting the possibility of pT3 tumor   prior to treatment.  No viable tumor cells are identified in the pools   of mucin.  Some of the small nodules of mucin in the perirectal adipose   tissue submitted as possible lymph nodes may represent discontinuous   tumor spread.  Alternatively, these nodules may represent lymph nodes   replaced by tumor.  However, no residual lymph node or lymph node   architecture is found associated with these nodular areas of mucin.  No   viable tumor cells are found in any of the areas of mucin suggesting a   complete response to neoadjuvant treatment.     Colonoscopy (6/20/2022):      Surgical Pathology (6/20/2022):      CT  Chest/Abdomen/Pelvis (8/24/2022):    Echocardiogram (7/17/2023):  Conclusions:   The left ventricular size and function are normal.   The ejection fraction is calculated to be 69% using the Nair's   method.   Moderate asymmetric septal hypertrophy is present.  The interventricular   septum measures 1.4 cm   The right ventricular size is at the upper limits of normal.   The right ventricular function is mildly decreased.   The left atrium is mildly enlarged.   The aortic valve is trileaflet and has mild degenerative changes.   There is mild aortic regurgitation and no significant stenosis.   The estimated pulmonary artery systolic pressure is normal.   When compared to previous echo, LVEF improved from 58% on 07/19/2020 to   69% today.     CEA (2/21/2024):  Ref Range & Units 6 mo ago   CEA  0.0 - 4.7 ng/mL 3.2     Colonoscopy (9/17/2024):  Findings:       The perianal and digital rectal examinations were normal.        Three semi-sessile polyps were found in the ascending colon. One was a        carpeted mass. The polyps were 5 to 9 mm in size. These polyps were        removed with a hot snare. Resection and retrieval were complete for two.        A large portion was removed form the third.        The rectal stump was short but normal appearing.                                                                                    Impression:               - Three 5 to 9 mm polyps in the ascending colon,                             removed with a hot snare. Resected and retrieved.                             The rectal stump was short but normal appearing.         Surgical Pathology (9/17/2024)  A.  Colon, Ascending, polyps x3, polypectomy:  -Juvenile/inflammatory type polyps.  -Negative for dysplasia or malignancy.       CT Chest/Abdomen/Pelvis (9/23/2024):  IMPRESSION:  1.  Stable postoperative changes from prior CABG, partial proctocolectomy, and a left lower quadrant colostomy creation.  2.  No findings to suggest  new metastatic disease within the chest, abdomen, or pelvis.  3.  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  4.  Moderate sized parastomal hernia in the left lower quadrant.  5.  Few additional incidental findings as detailed above.    Medical history:  CAD  Colon cancer  Borderline diabetes   HTN    Surgical history:  Quadruple CABG   S/p LAR with end colostomy     Family history:  No family history on file.      Medications:  Current Outpatient Medications   Medication Sig Dispense Refill    aspirin (ASA) 81 MG chewable tablet Take 81 mg by mouth daily      atorvastatin (LIPITOR) 40 MG tablet Take 40 mg by mouth daily      chlorthalidone (HYGROTON) 25 MG tablet Take 25 mg by mouth daily      clopidogrel (PLAVIX) 75 MG tablet Take 75 mg by mouth daily      coenzyme Q-10 capsule Take 1 capsule by mouth daily      metoprolol tartrate (LOPRESSOR) 25 MG tablet Take 1 tablet (25 mg) by mouth daily 90 tablet 3    Multiple Vitamin (MULTIVITAMIN ADULT) TABS daily      sildenafil (VIAGRA) 100 MG tablet Take 0.5-1 tablets ( mg) by mouth daily as needed (prior to intercourse) 30 tablet 0    traZODone (DESYREL) 50 MG tablet Take 50 mg by mouth nightly as needed      vitamin B complex with vitamin C (VITAMIN  B COMPLEX) tablet Take 1 tablet by mouth twice a week      Vitamin D3 (CHOLECALCIFEROL) 25 mcg (1000 units) tablet Take 1 tablet by mouth twice a week         Allergies:  No Known Allergies    Social history:   Social History     Tobacco Use    Smoking status: Former     Types: Cigarettes    Smokeless tobacco: Never   Substance Use Topics    Alcohol use: Not Currently     Marital status: single.    ROS:  A complete review of systems was performed with the patient and all systems negative except as per HPI.    Physical Examination:  Exam was chaperoned by Agapito Banuelos, EMT-P  /81 (BP Location: Left arm, Patient  Position: Sitting, Cuff Size: Adult Regular)   Pulse 64   SpO2 97%   General: Well hydrated. No acute distress.  CV: RRR  Lung: Non-labored breathing on RA  Abdomen: Soft, NT, parastomal hernia. No inguinal adenopathy palpated.      ASSESSMENT    This is a 74 year old M with h/o rectal cancer s/p nCRT and robot LAR with end colostomy in 2017. We had a long conversation about the functional outcomes in this situation. It is unclear to me why the the surgeon did not perform an anastomosis. Given the short rectal stump, prolonged diversion, and radiation, I talked to him about the poor functional outcomes associated with this. However, he was incidentally diagnosed with an appendiceal neoplasm that would need to be removed via appendectomy. We also discussed the possibility of needing a completion colectomy if this was an adenocarcinoma. In regards to his parastomal hernia, this may be repaired at another time. We discussed pros and cons of a [place]aker repair, including high recurrence rates. He asked appropriate questions, which were answered. Risks, benefits, and alternatives of operative treatment were thoroughly discussed with the patient, he understands these well and agrees to proceed.    All pertinent labs and imaging were personally reviewed by me.      PLAN  - Tumor markers  - To OR for lap appy  - Preop teaching and eval  - Cardiology clearance   - OK to continue ASA, would need plavix held 7 days before surgery.      60 minutes spent on the date of the encounter doing chart review, history and exam, imaging review, documentation and further activities as noted above.      Wilfrid Torrez MD    Division of Colon and Rectal Surgery  [facility] [place]    Referring Provider:  Timur Bustos MD  [id number] [id number]     Primary Care Provider:  Timur Bustos

## 2024-09-13 NOTE — TELEPHONE ENCOUNTER
Patient calling back stating he cannot find his prep instructions. RN did advise they were sent 8/27/24.     RN resent as patient states they are not in mychart.     Patient has no further questions.    Nila Saeed, KIKA  Endoscopy Procedure Pre Assessment RN

## 2024-09-16 DIAGNOSIS — C20 RECTAL CANCER (H): Primary | ICD-10-CM

## 2024-09-17 ENCOUNTER — HOSPITAL ENCOUNTER (OUTPATIENT)
Facility: CLINIC | Age: 74
Discharge: HOME | End: 2024-09-17
Attending: COLON & RECTAL SURGERY | Admitting: COLON & RECTAL SURGERY
Payer: MEDICARE

## 2024-09-17 VITALS
WEIGHT: 168 LBS | HEART RATE: 68 BPM | DIASTOLIC BLOOD PRESSURE: 85 MMHG | BODY MASS INDEX: 24.88 KG/M2 | RESPIRATION RATE: 14 BRPM | SYSTOLIC BLOOD PRESSURE: 138 MMHG | HEIGHT: 69 IN | OXYGEN SATURATION: 95 %

## 2024-09-17 LAB — COLONOSCOPY: NORMAL

## 2024-09-17 PROCEDURE — 44394 COLONOSCOPY W/SNARE: CPT | Mod: PT | Performed by: COLON & RECTAL SURGERY

## 2024-09-17 PROCEDURE — 88342 IMHCHEM/IMCYTCHM 1ST ANTB: CPT | Mod: TC | Performed by: COLON & RECTAL SURGERY

## 2024-09-17 PROCEDURE — 45385 COLONOSCOPY W/LESION REMOVAL: CPT | Mod: PT | Performed by: COLON & RECTAL SURGERY

## 2024-09-17 PROCEDURE — 88305 TISSUE EXAM BY PATHOLOGIST: CPT | Mod: 26 | Performed by: PATHOLOGY

## 2024-09-17 PROCEDURE — 88342 IMHCHEM/IMCYTCHM 1ST ANTB: CPT | Mod: 26 | Performed by: PATHOLOGY

## 2024-09-17 RX ORDER — ONDANSETRON 2 MG/ML
4 INJECTION INTRAMUSCULAR; INTRAVENOUS
Status: DISCONTINUED | OUTPATIENT
Start: 2024-09-17 | End: 2024-09-17 | Stop reason: HOSPADM

## 2024-09-17 RX ORDER — LIDOCAINE 40 MG/G
CREAM TOPICAL
Status: DISCONTINUED | OUTPATIENT
Start: 2024-09-17 | End: 2024-09-17 | Stop reason: HOSPADM

## 2024-09-17 ASSESSMENT — ACTIVITIES OF DAILY LIVING (ADL): ADLS_ACUITY_SCORE: 35

## 2024-09-17 NOTE — H&P
Colon & Rectal Surgery History and Physical  Pre-Endoscopy Procedure Note    History of Present Illness   I have been asked by Dr. Busots to evaluate this 74 year old male for colorectal cancer surveillance. He currently denies any abdominal pain, weight loss, bleeding per rectum, or recent change in bowel habits.    Past Medical History  Diagnosis Date    High cholesterol     Hypertension        Past Surgical History   No past surgical history on file.     Medications  Medications Prior to Admission   Medication Sig    aspirin (ASA) 81 MG chewable tablet Take 81 mg by mouth daily    atorvastatin (LIPITOR) 40 MG tablet Take 40 mg by mouth daily    chlorthalidone (HYGROTON) 25 MG tablet Take 25 mg by mouth daily    clopidogrel (PLAVIX) 75 MG tablet Take 75 mg by mouth daily    coenzyme Q-10 capsule Take 1 capsule by mouth daily    metoprolol tartrate (LOPRESSOR) 25 MG tablet Take 1 tablet (25 mg) by mouth daily    Multiple Vitamin (MULTIVITAMIN ADULT) TABS daily    sildenafil (VIAGRA) 100 MG tablet Take 0.5-1 tablets ( mg) by mouth daily as needed (prior to intercourse)    traZODone (DESYREL) 50 MG tablet Take 50 mg by mouth nightly as needed    vitamin B complex with vitamin C (VITAMIN  B COMPLEX) tablet Take 1 tablet by mouth twice a week    Vitamin D3 (CHOLECALCIFEROL) 25 mcg (1000 units) tablet Take 1 tablet by mouth twice a week       Allergies   No Known Allergies     Family History   Family history is not contributory.     Social History   He reports that he has quit smoking. His smoking use included cigarettes. He has never used smokeless tobacco. He reports that he does not currently use alcohol.    Review of Systems   Constitutional:  No fever, weight change or fatigue.    Eyes:     No dry eyes or vision changes.   Ears/Nose/Throat/Neck:  No oral ulcers, sore throat or voice change.    Cardiovascular:   No palpitations, syncope, angina or edema.   Respiratory:    No chest pain, excessive sleepiness,  shortness of breath or hemoptysis.    Gastrointestinal:   No abdominal pain, nausea, vomiting, diarrhea or heartburn.    Genitourinary:   No dysuria, hematuria, urinary retention or urinary frequency.   Musculoskeletal:  No joint swelling or arthralgias.    Dermatologic:  No skin rash or other skin changes.   Neurologic:    No focal weakness or numbness. No neuropathy.   Psychiatric:    No depression, anxiety, suicidal ideation, or paranoid ideation.   Endocrine:   No cold or heat intolerance, polydipsia, hirsutism, change in libido, or flushing.   Hematology/Lymphatic:  No bleeding or lymphadenopathy.    Allergy/Immunology:  No rhinitis or hives.     Physical Exam   Vitals:  Blood pressure 133/95, pulse 80, resp. rate 15, SpO2 96%.    General:  Alert and oriented to person, place and time   Airway: Normal oropharyngeal airway and neck mobility   Lungs:  Clear bilaterally   Heart:  Regular rate and rhythm   Abdomen: Soft, NT, ND, no masses   Extremities: Warm, good capillary refill    ASA Grade: II (mild systemic disease)    Impression: Cleared for use of conscious sedation for colorectal cancer surveillance    Plan: Proceed with colonoscopy     Diana Mancilla MD  [facility]  [phone]

## 2024-09-23 ENCOUNTER — HOSPITAL ENCOUNTER (OUTPATIENT)
Dept: CT IMAGING | Facility: CLINIC | Age: 74
Discharge: HOME | End: 2024-09-23
Attending: SURGERY | Admitting: SURGERY
Payer: MEDICARE

## 2024-09-23 DIAGNOSIS — C20 RECTAL CANCER (H): ICD-10-CM

## 2024-09-23 LAB
CREAT BLD-MCNC: 0.9 MG/DL (ref 0.7–1.3)
EGFRCR SERPLBLD CKD-EPI 2021: >60 ML/MIN/1.73M2
PATH REPORT.ADDENDUM SPEC: NORMAL
PATH REPORT.COMMENTS IMP SPEC: NORMAL
PATH REPORT.FINAL DX SPEC: NORMAL
PATH REPORT.GROSS SPEC: NORMAL
PATH REPORT.MICROSCOPIC SPEC OTHER STN: NORMAL
PATH REPORT.RELEVANT HX SPEC: NORMAL
PHOTO IMAGE: NORMAL

## 2024-09-23 PROCEDURE — 82565 ASSAY OF CREATININE: CPT

## 2024-09-23 PROCEDURE — 71260 CT THORAX DX C+: CPT | Mod: MG

## 2024-09-23 PROCEDURE — 250N000011 HC RX IP 250 OP 636: Performed by: SURGERY

## 2024-09-23 PROCEDURE — 250N000009 HC RX 250: Performed by: SURGERY

## 2024-09-23 RX ORDER — IOPAMIDOL 755 MG/ML
82 INJECTION, SOLUTION INTRAVASCULAR ONCE
Status: COMPLETED | OUTPATIENT
Start: 2024-09-23 | End: 2024-09-23

## 2024-09-23 RX ADMIN — IOPAMIDOL 82 ML: 755 INJECTION, SOLUTION INTRAVENOUS at 18:07

## 2024-09-23 RX ADMIN — SODIUM CHLORIDE 63 ML: 9 INJECTION, SOLUTION INTRAVENOUS at 18:07

## 2024-09-24 ENCOUNTER — OFFICE VISIT (OUTPATIENT)
Dept: FAMILY MEDICINE | Facility: CLINIC | Age: 74
End: 2024-09-24
Payer: MEDICARE

## 2024-09-24 ENCOUNTER — PRE VISIT (OUTPATIENT)
Dept: SURGERY | Facility: CLINIC | Age: 74
End: 2024-09-24

## 2024-09-24 ENCOUNTER — OFFICE VISIT (OUTPATIENT)
Dept: SURGERY | Facility: CLINIC | Age: 74
End: 2024-09-24
Attending: INTERNAL MEDICINE
Payer: MEDICARE

## 2024-09-24 VITALS — HEART RATE: 64 BPM | DIASTOLIC BLOOD PRESSURE: 81 MMHG | SYSTOLIC BLOOD PRESSURE: 137 MMHG | OXYGEN SATURATION: 97 %

## 2024-09-24 VITALS
OXYGEN SATURATION: 94 % | WEIGHT: 170.8 LBS | BODY MASS INDEX: 25.3 KG/M2 | SYSTOLIC BLOOD PRESSURE: 124 MMHG | HEART RATE: 61 BPM | RESPIRATION RATE: 10 BRPM | DIASTOLIC BLOOD PRESSURE: 70 MMHG | HEIGHT: 69 IN | TEMPERATURE: 97.8 F

## 2024-09-24 DIAGNOSIS — Z93.3 COLOSTOMY IN PLACE (H): ICD-10-CM

## 2024-09-24 DIAGNOSIS — I25.10 CORONARY ARTERY DISEASE INVOLVING NATIVE CORONARY ARTERY OF NATIVE HEART WITHOUT ANGINA PECTORIS: ICD-10-CM

## 2024-09-24 DIAGNOSIS — K43.5 PARASTOMAL HERNIA: Primary | ICD-10-CM

## 2024-09-24 DIAGNOSIS — R93.5 ABNORMAL CT OF THE ABDOMEN: ICD-10-CM

## 2024-09-24 DIAGNOSIS — D37.3 LOW GRADE MUCINOUS NEOPLASM OF APPENDIX: Primary | ICD-10-CM

## 2024-09-24 DIAGNOSIS — I10 ESSENTIAL HYPERTENSION: ICD-10-CM

## 2024-09-24 DIAGNOSIS — R97.8 OTHER ABNORMAL TUMOR MARKERS: ICD-10-CM

## 2024-09-24 DIAGNOSIS — K43.5 PARASTOMAL HERNIA WITHOUT OBSTRUCTION OR GANGRENE: ICD-10-CM

## 2024-09-24 DIAGNOSIS — R73.03 PREDIABETES: Primary | ICD-10-CM

## 2024-09-24 DIAGNOSIS — D37.3 LOW GRADE MUCINOUS NEOPLASM OF APPENDIX: ICD-10-CM

## 2024-09-24 DIAGNOSIS — Z85.048 HISTORY OF RECTAL CANCER: ICD-10-CM

## 2024-09-24 PROCEDURE — 99214 OFFICE O/P EST MOD 30 MIN: CPT | Performed by: INTERNAL MEDICINE

## 2024-09-24 PROCEDURE — G2211 COMPLEX E/M VISIT ADD ON: HCPCS | Performed by: INTERNAL MEDICINE

## 2024-09-24 PROCEDURE — 99205 OFFICE O/P NEW HI 60 MIN: CPT | Performed by: SURGERY

## 2024-09-24 ASSESSMENT — PAIN SCALES - GENERAL
PAINLEVEL: NO PAIN (0)
PAINLEVEL: NO PAIN (0)

## 2024-09-24 NOTE — LETTER
2024       RE: James Kebede  [address] [address]  [facility] [address]     Dear Colleague,    Thank you for referring your patient, James Kebede, to the [facility] COLON AND RECTAL [facility]ERY [facility]IN[facility] [place] at [facility]. Please see a copy of my visit note below.    Colon and Rectal Surgery Clinic Note    RE: James Kebede.  : 1950.  RODRIGUEZ: 2024.    Reason for visit: colostomy takedown.    HPI: James Kebede is a 74 year old male who presents today to discuss a colostomy takedown. 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 Tomas Lou in [place]. Surgical Pathology demonstrated no residual disease, ypT0N0. See pathology comment in red below. Most recent CT Chest/Abdomen/Pelvis in  demonstrated no recurrent disease. CEA in 2024 was 3.2 (baseline 4.0). Colonoscopy on 2024 demonstrated three juvenile/inflammatory polyps in the ascending colon. 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      Today James feels fine. 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 He is on 81mg of Asprin and Plavix.    Robotic LAR with end colostomy (2017):  Description of Procedure  After appropriate consent including risks, benefits and alternatives, patient was brought to the OR, and placed in lithotomy position. Anesthesia was administered.  All melly prominences were padded, antibiotics were given, and scds were placed.   The patient was prepped and drapped in usual sterile fashion.  A 12mm  port visiport was placed on the left upper quadrant as out assistant port. . The abdomen was insufflated to 15mm Hg.   The laparoscope was inserted and confirmed no intra-abdominal contents were injured upon entry.  The abdomen was explored laparoscopically.   A 8 mm robotic port was placed in the right paraumbilical region under direct vision of the laparoscope after instilling local and making an incision.An 8mm robotic port was periumbilically at our future colostomy site  A 8 mm was placed in the RLQ and was placed under direct vision of the laparoscope (after instillation of local)   The robot was docked  The left colon was mobilized off the retroperitoneum using monopolar scissors in a medial to lateral technique. The inferior mesenteric artery and vein were isolated and divided an vessel sealer device . The dissection was carried laterally to the abdominal wall and cephalad to near the splenic flexure. The ureter was identified prior to division and confirmed to be out of the way.  The lateral peritoneal attachments were then incised with vessel sealer   The dissection was carried down toward the pelvis.   A flexible sigmoidoscopy was performed to confirm the level of the ink and tumor. A endo PERLA 60mm purple load was fired distal to the ink an additional 2 45mm staple firings were necessary, this was visualized on the sigmoidoscope. The pelvis was irrigated and found to be hemostatic.   The robot was then undocked from the patient.  A circular inscion was made around the port site and carried to the level of the anterior rectus. The fascia was entered vertically, the rectus was split and the posterior rectus fascia was opened. A small Lemuel wound retractor was then inserted in the 8mm robotic port after the specimen was grasped.   The sigmoid colon was brought out of the abdomen an additional .The incisions were copiously irrigated and 40 monycryl was used in a running fashion to close skin. An 2-0 prolene  was used in a figure 8 fashion to approximate fascia around the stoma site  The mesentery was then divided in a clamp and tied with 0 silk to this region of the bowel. Endo PERLA stapler was used to transect the proximal bowel. The staple line was then divided with electrocautery and the colostomy was Brooked with 3-O vicryl. Dermabond and the ostomy appliance were applied.     Surgical Pathology (8/31/2024):  FINAL DIAGNOSIS:     Rectum (22.5 cm in length), resection:   History of mucinous adenocarcinoma of the rectum, status-post   neoadjuvant treatment with no residual viable tumor; please see   [id number] and [id number] for pre-treatment protocol elements:   Tumor site: Rectum.   Tumor size: No residual invasive carcinoma after presurgical   (neoadjuvant) therapy.   Microscopic depth/extent of invasion: See comment.   Gross evaluation of mesorectum: Complete (score 1).   Tumor deposits: See comment.   Microsatellite instability (MMR protein expression status): Negative for   MSI-H (see [id number]).   Treatment effect: Complete tumor response, score 0.   Regional lymph nodes:   Total lymph nodes identified: 19.   Lymph nodes with metastases: Zero (0/19),   Margins: Negative for tumor   Pathologic stage (AJCC 7th ed.):  ypT0 ypN0   Distant metastasis: Not applicable.   Additional findings: Tattoo ink is identified distal to the main mas   s   near the distal mucosal margin..   __ __ __   COMMENT:   There are large mucin pools within the rectal wall and extending into   perirectal adipose tissue, adjacent to the deep margin and within the   node bearing adipose tissue, suggesting the possibility of pT3 tumor   prior to treatment.  No viable tumor cells are identified in the pools   of mucin.  Some of the small nodules of mucin in the perirectal adipose   tissue submitted as possible lymph nodes may represent discontinuous   tumor spread.  Alternatively, these nodules may represent lymph nodes   replaced by tumor.   However, no residual lymph node or lymph node   architecture is found associated with these nodular areas of mucin.  No   viable tumor cells are found in any of the areas of mucin suggesting a   complete response to neoadjuvant treatment.     Colonoscopy (6/20/2022):      Surgical Pathology (6/20/2022):      CT Chest/Abdomen/Pelvis (8/24/2022):    Echocardiogram (7/17/2023):  Conclusions:   The left ventricular size and function are normal.   The ejection fraction is calculated to be 69% using the Nair's   method.   Moderate asymmetric septal hypertrophy is present.  The interventricular   septum measures 1.4 cm   The right ventricular size is at the upper limits of normal.   The right ventricular function is mildly decreased.   The left atrium is mildly enlarged.   The aortic valve is trileaflet and has mild degenerative changes.   There is mild aortic regurgitation and no significant stenosis.   The estimated pulmonary artery systolic pressure is normal.   When compared to previous echo, LVEF improved from 58% on 07/19/2020 to   69% today.     CEA (2/21/2024):  Ref Range & Units 6 mo ago   CEA  0.0 - 4.7 ng/mL 3.2     Colonoscopy (9/17/2024):  Findings:       The perianal and digital rectal examinations were normal.        Three semi-sessile polyps were found in the ascending colon. One was a        carpeted mass. The polyps were 5 to 9 mm in size. These polyps were        removed with a hot snare. Resection and retrieval were complete for two.        A large portion was removed form the third.        The rectal stump was short but normal appearing.                                                                                    Impression:               - Three 5 to 9 mm polyps in the ascending colon,                             removed with a hot snare. Resected and retrieved.                             The rectal stump was short but normal appearing.         Surgical Pathology (9/17/2024)  A.  Colon,  Ascending, polyps x3, polypectomy:  -Juvenile/inflammatory type polyps.  -Negative for dysplasia or malignancy.       CT Chest/Abdomen/Pelvis (9/23/2024):  IMPRESSION:  1.  Stable postoperative changes from prior CABG, partial proctocolectomy, and a left lower quadrant colostomy creation.  2.  No findings to suggest new metastatic disease within the chest, abdomen, or pelvis.  3.  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  4.  Moderate sized parastomal hernia in the left lower quadrant.  5.  Few additional incidental findings as detailed above.    Medical history:  CAD  Colon cancer  Borderline diabetes   HTN    Surgical history:  Quadruple CABG   S/p LAR with end colostomy     Family history:  No family history on file.      Medications:  Current Outpatient Medications   Medication Sig Dispense Refill     aspirin (ASA) 81 MG chewable tablet Take 81 mg by mouth daily       atorvastatin (LIPITOR) 40 MG tablet Take 40 mg by mouth daily       chlorthalidone (HYGROTON) 25 MG tablet Take 25 mg by mouth daily       clopidogrel (PLAVIX) 75 MG tablet Take 75 mg by mouth daily       coenzyme Q-10 capsule Take 1 capsule by mouth daily       metoprolol tartrate (LOPRESSOR) 25 MG tablet Take 1 tablet (25 mg) by mouth daily 90 tablet 3     Multiple Vitamin (MULTIVITAMIN ADULT) TABS daily       sildenafil (VIAGRA) 100 MG tablet Take 0.5-1 tablets ( mg) by mouth daily as needed (prior to intercourse) 30 tablet 0     traZODone (DESYREL) 50 MG tablet Take 50 mg by mouth nightly as needed       vitamin B complex with vitamin C (VITAMIN  B COMPLEX) tablet Take 1 tablet by mouth twice a week       Vitamin D3 (CHOLECALCIFEROL) 25 mcg (1000 units) tablet Take 1 tablet by mouth twice a week         Allergies:  No Known Allergies    Social history:   Social History     Tobacco Use     Smoking status: Former     Types: Cigarettes      Smokeless tobacco: Never   Substance Use Topics     Alcohol use: Not Currently     Marital status: single.    ROS:  A complete review of systems was performed with the patient and all systems negative except as per HPI.    Physical Examination:  Exam was chaperoned by Agapito Banuelos, EMT-P  /81 (BP Location: Left arm, Patient Position: Sitting, Cuff Size: Adult Regular)   Pulse 64   SpO2 97%   General: Well hydrated. No acute distress.  CV: RRR  Lung: Non-labored breathing on RA  Abdomen: Soft, NT, parastomal hernia. No inguinal adenopathy palpated.      ASSESSMENT    This is a 74 year old M with h/o rectal cancer s/p nCRT and robot LAR with end colostomy in 2017. We had a long conversation about the functional outcomes in this situation. It is unclear to me why the the surgeon did not perform an anastomosis. Given the short rectal stump, prolonged diversion, and radiation, I talked to him about the poor functional outcomes associated with this. However, he was incidentally diagnosed with an appendiceal neoplasm that would need to be removed via appendectomy. We also discussed the possibility of needing a completion colectomy if this was an adenocarcinoma. In regards to his parastomal hernia, this may be repaired at another time. We discussed pros and cons of a Sugarbaker repair, including high recurrence rates. He asked appropriate questions, which were answered. Risks, benefits, and alternatives of operative treatment were thoroughly discussed with the patient, he understands these well and agrees to proceed.    All pertinent labs and imaging were personally reviewed by me.      PLAN  - Tumor markers  - To OR for lap appy  - Preop teaching and eval  - Cardiology clearance   - OK to continue ASA, would need plavix held 7 days before surgery.      60 minutes spent on the date of the encounter doing chart review, history and exam, imaging review, documentation and further activities as noted above.      Wilfrid  MD Shan    Division of Colon and Rectal Surgery  [facility]    Referring Provider:  Timur Bustos MD  [address]  [place] [address]     Primary Care Provider:  Timru Bustos      Again, thank you for allowing me to participate in the care of your patient.      Sincerely,    Wilfrid Torrez MD

## 2024-09-24 NOTE — PATIENT INSTRUCTIONS
Follow up:    Scheduling will give you a call within three business days to schedule surgery    Appointment you will need in prep: pre op physical with our anesthesia team and blood work    You will be undergoing a large operation. In preparation for your surgery we ask that you focus on a healthy lifestyle to help in the aid of your recovery and to reduce your post surgical complications. Below are a few guidelines to follow:    Schedule an appointment with your primary care physician to discuss how to best improve your overall health condition   If you have diabetes, please visit with your provider to optimize your blood sugar control   Engage in 30 minutes of exercise 5x a week or to the best of your ability. This can be in the form of walking, cycling, weight lifting, running or swimming  Focus on a healthy diet, high in fiber and protein  Vegetables and fruit at every meal   Lean proteins such as fish and chicken   Protein shakes are also encourage   Drink at least 64 ounces of water daily   Avoid alcohol and excessive caffeine   Stop smoking if you are currently smoking     KIKA Arreguin [phone]    Clinic Fax Number [phone]    Surgery Scheduling [phone]    My Chart is available 24 hours a day and is a secure way to access your records and communicate with your care team.  I strongly recommend signing up if you haven't already done so, if you are comfortable with computers.  If you would like to inquire about this or are having problems with My Chart access, you may call [phone] or go online at [email or web address].  Please allow at least 24 hours for a response and extra time on weekends and Holidays.

## 2024-09-24 NOTE — NURSING NOTE
Chief Complaint   Patient presents with    Consult       Vitals:    09/24/24 1048   BP: 137/81   BP Location: Left arm   Patient Position: Sitting   Cuff Size: Adult Regular   Pulse: 64   SpO2: 97%       There is no height or weight on file to calculate BMI.    Agapito Banuelos EMT-P

## 2024-09-24 NOTE — PROGRESS NOTES
"  Assessment & Plan     Prediabetes  Most recent A1c is reviewed.  The patient is watching starches.  No indication for medication at this time.    Essential hypertension  Stable on current regimen.  No changes.    Coronary artery disease involving native coronary artery of native heart without angina pectoris  Has since established with cardiology.  Maintained on statin aspirin beta-blocker.  He is also on clopidogrel and at present deemed long-term.    Colostomy in place (H)  Findings are reviewed with the patient.  The patient saw colorectal earlier today.  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  Patient is planning to schedule an appendectomy.  His colostomy takedown odds are not in his favor.    The patient is wondering about getting a second opinion.  After some discussion he may seek a consultation with Dr. Cohen back in [place] who did his original surgery.      Return to clinic in 6 months.  Sooner as needed.      30 minutes spent by me on the date of the encounter doing chart review, history and exam, documentation and further activities per the note    The longitudinal plan of care for the diagnosis(es)/condition(s) as documented were addressed during this visit. Due to the added complexity in care, I will continue to support James in the subsequent management and with ongoing continuity of care.    BMI  Estimated body mass index is 25.44 kg/m  as calculated from the following:    Height as of this encounter: 1.745 m (5' 8.7\").    Weight as of this encounter: 77.5 kg (170 lb 12.8 oz).       Subjective   James is a 74 year old, presenting for the following health issues:  Follow Up    HTN  The patient has a history of hypertension.  Blood pressure is noted as below.  Compliance with medication is noted.  Side effects of medication are not described.  Symptoms of uncontrolled hypertension including chest pain, dizziness, and vision changes have not been observed.    Prediabetes:  A1c " "reviewed    CAD:  Est with cards.    Note reviewed    Ostomy:  He is interested in take down.  He has met with colorectal.  Has had C scope.  Incidental finding of appendiceal neoplasm also discovered. Appy has been suggested.         History of Present Illness       Reason for visit:  Follow up   He is taking medications regularly.                     Objective    /70 (BP Location: Left arm, Patient Position: Sitting, Cuff Size: Adult Regular)   Pulse 61   Temp 97.8  F (36.6  C) (Tympanic)   Resp 10   Ht 1.745 m (5' 8.7\")   Wt 77.5 kg (170 lb 12.8 oz)   SpO2 94%   BMI 25.44 kg/m    Body mass index is 25.44 kg/m .  Physical Exam   General:  Awake, alert and NAD  HEENT:  NCAT, MMM  RESP:  No accessory muscle use, no audible wheezing.   ABD:  no pulsatile mass, non protuberant  EXT: non obvious C/C/E  PSYCH: Pleasant, conversant, makes eye contact              Signed Electronically by: Timur Bustos MD    "

## 2024-10-03 ENCOUNTER — TELEPHONE (OUTPATIENT)
Dept: SURGERY | Facility: CLINIC | Age: 74
End: 2024-10-03
Payer: MEDICARE

## 2024-10-03 NOTE — TELEPHONE ENCOUNTER
Spoke to patient in attempt to schedule surgery with Dr. Torrez.     Patient states he would like to hold off on scheduling surgery at this time as his son in-law recommended he get a second opinion.     Patient states he is seeing Dr. Victor Manuel Cohen with [facility] in [place] via tele med on 10/23 for 2nd opinion and will call to let us know either way how he wishes to proceed.         Raymon Mistry on 10/3/2024 at 12:49 PM

## 2024-10-09 ENCOUNTER — TELEPHONE (OUTPATIENT)
Dept: SURGERY | Facility: CLINIC | Age: 74
End: 2024-10-09
Payer: MEDICARE

## 2024-10-09 NOTE — CONFIDENTIAL NOTE
James saw Dr. Wilfrid Torrez on 9/24/25    ASSESSMENT   This is a 74 year old M with h/o rectal cancer s/p nCRT and robot LAR with end colostomy in 2017. We had a long conversation about the functional outcomes in this situation. It is unclear to me why the the surgeon did not perform an anastomosis. Given the short rectal stump, prolonged diversion, and radiation, I talked to him about the poor functional outcomes associated with this. However, he was incidentally diagnosed with an appendiceal neoplasm that would need to be removed via appendectomy. We also discussed the possibility of needing a completion colectomy if this was an adenocarcinoma. In regards to his parastomal hernia, this may be repaired at another time. We discussed pros and cons of a Sugarbaker repair, including high recurrence rates. He asked appropriate questions, which were answered. Risks, benefits, and alternatives of operative treatment were thoroughly discussed with the patient, he understands these well and agrees to proceed.     All pertinent labs and imaging were personally reviewed by me.     PLAN  - Tumor markers  - To OR for lap appy  - Preop teaching and eval  - Cardiology clearance   - OK to continue ASA, would need plavix held 7 days before surgery.      He is getting a second opinion and wants his records faxed from [facility]. I provided him with Medical records number. He appreciated my call and denied further questions.

## 2024-10-09 NOTE — TELEPHONE ENCOUNTER
[facility] Call Center    Phone Message    May a detailed message be left on voicemail: yes     Reason for Call: Other: Patient just had imaging done. He is feeling that he wants Dr. Torrez to take a look at the imaging and give the patient back to go over it. He also is wanting the imaging sent to Dr. Victor Manuel Cohen at [facility] in [place]. Fax: [phone].     Action Taken: Message routed to:  [facility]    Travel Screening: Not Applicable     Date of Service:

## 2024-10-21 DIAGNOSIS — I25.10 CORONARY ARTERY DISEASE INVOLVING NATIVE CORONARY ARTERY OF NATIVE HEART WITHOUT ANGINA PECTORIS: Primary | ICD-10-CM

## 2024-10-21 RX ORDER — ATORVASTATIN CALCIUM 40 MG/1
40 TABLET, FILM COATED ORAL DAILY
Qty: 90 TABLET | Refills: 3 | Status: SHIPPED | OUTPATIENT
Start: 2024-10-21

## 2024-10-21 RX ORDER — CHLORTHALIDONE 25 MG/1
25 TABLET ORAL DAILY
Qty: 90 TABLET | Refills: 1 | Status: SHIPPED | OUTPATIENT
Start: 2024-10-21

## 2024-10-21 RX ORDER — CLOPIDOGREL BISULFATE 75 MG/1
75 TABLET ORAL DAILY
Qty: 90 TABLET | Refills: 3 | Status: SHIPPED | OUTPATIENT
Start: 2024-10-21

## 2024-11-25 ENCOUNTER — MYC MEDICAL ADVICE (OUTPATIENT)
Dept: FAMILY MEDICINE | Facility: CLINIC | Age: 74
End: 2024-11-25
Payer: MEDICARE

## 2024-11-26 ENCOUNTER — OFFICE VISIT (OUTPATIENT)
Dept: FAMILY MEDICINE | Facility: CLINIC | Age: 74
End: 2024-11-26
Payer: MEDICARE

## 2024-11-26 VITALS
SYSTOLIC BLOOD PRESSURE: 137 MMHG | TEMPERATURE: 98 F | WEIGHT: 172.3 LBS | DIASTOLIC BLOOD PRESSURE: 79 MMHG | RESPIRATION RATE: 12 BRPM | HEART RATE: 62 BPM | OXYGEN SATURATION: 98 % | HEIGHT: 68 IN | BODY MASS INDEX: 26.11 KG/M2

## 2024-11-26 DIAGNOSIS — Z01.818 PREOP EXAM FOR INTERNAL MEDICINE: Primary | ICD-10-CM

## 2024-11-26 DIAGNOSIS — R73.03 PREDIABETES: ICD-10-CM

## 2024-11-26 DIAGNOSIS — K43.5 PARASTOMAL HERNIA WITHOUT OBSTRUCTION OR GANGRENE: ICD-10-CM

## 2024-11-26 LAB
EST. AVERAGE GLUCOSE BLD GHB EST-MCNC: 126 MG/DL
HBA1C MFR BLD: 6 % (ref 0–5.6)
HGB BLD-MCNC: 14.8 G/DL (ref 13.3–17.7)

## 2024-11-26 PROCEDURE — 36415 COLL VENOUS BLD VENIPUNCTURE: CPT | Performed by: INTERNAL MEDICINE

## 2024-11-26 PROCEDURE — 83036 HEMOGLOBIN GLYCOSYLATED A1C: CPT | Performed by: INTERNAL MEDICINE

## 2024-11-26 PROCEDURE — 99214 OFFICE O/P EST MOD 30 MIN: CPT | Performed by: INTERNAL MEDICINE

## 2024-11-26 PROCEDURE — 82565 ASSAY OF CREATININE: CPT | Performed by: INTERNAL MEDICINE

## 2024-11-26 PROCEDURE — 85018 HEMOGLOBIN: CPT | Performed by: INTERNAL MEDICINE

## 2024-11-26 PROCEDURE — 93000 ELECTROCARDIOGRAM COMPLETE: CPT | Performed by: INTERNAL MEDICINE

## 2024-11-26 ASSESSMENT — PAIN SCALES - GENERAL: PAINLEVEL_OUTOF10: NO PAIN (0)

## 2024-11-26 NOTE — PATIENT INSTRUCTIONS
STOP all supplements and vitamins 1 week before surgery    STOP Aspirin 1 week before surgery    STOP Clopidogrel 5 days before surgery    CHLORTHALIDONE:  don't take it the morning of the surgery

## 2024-11-26 NOTE — PROGRESS NOTES
Preoperative Evaluation  [facility]  [address] [facility] [facility], [address]  [facility] [address]  Phone: [phone]  Primary Provider: Timru Bustos MD  Pre-op Performing Provider: Timur Bustos MD  Nov 26, 2024 11/25/2024   Surgical Information   What procedure is being done? Removal of appendix and LAPAROSCOPIC PARASTOMAL HERNIA REPAIR WITH MESH     Facility or Hospital where procedure/surgery will be performed: [facility], [place], [place].    Who is doing the procedure / surgery? Dr. Victor Manuel Cohen    Date of surgery / procedure: 12/3/24    Time of surgery / procedure: 7:30 AM    Where do you plan to recover after surgery? Other - in [place] initially.          Patient-reported     Fax number for surgical facility: [phone]    Assessment & Plan     The proposed surgical procedure is considered INTERMEDIATE risk.    Preop exam for internal medicine  Acceptable risk.    A1c today is 6.0  Creatinine is in process.    Surgical team can access note/records through Care Everywhere.      Proceed as planned.    - Creatinine  - Hemoglobin  - Hemoglobin A1c  - EKG 12-lead complete w/read - Clinics  - Creatinine  - Hemoglobin  - Hemoglobin A1c    Parastomal hernia without obstruction or gangrene  As above  - Creatinine  - Hemoglobin  - Hemoglobin A1c  - EKG 12-lead complete w/read - Clinics  - Creatinine  - Hemoglobin  - Hemoglobin A1c    Prediabetes  A1c reviewed. Ok to proceed with surgery.    - Hemoglobin A1c  - Hemoglobin A1c        Risks and Recommendations  The patient has the following additional risks and recommendations for perioperative complications:  Cardiovascular:   - History is reviewed.  Cardiac status is stable.      Antiplatelet or Anticoagulation Medication Instructions   - aspirin: Discontinue aspirin 7-10 days prior to procedure to reduce bleeding risk. It should be resumed postoperatively.    - clopidrogel (Plavix), prasugrel (Effient), ticagrelor (Brilinta): No  contraindication to stopping Plavix, DO NOT TAKE 5-7 days before surgery.     Additional Medication Instructions  Take all scheduled medications on the day of surgery EXCEPT for modifications listed below:    Recommendation  Approval given to proceed with proposed procedure, without further diagnostic evaluation.    Ro Ramirez is a 74 year old, presenting for the following:  Pre-Op Exam    HPI related to upcoming procedure:     Pt is here for preop risk stratification prior to planned GI surgery.    He will be getting this done at [facility].  Notes in Care Everywhere are reviewed.        He does have CAD, s/p CABG.  He rides his bike regularly.  No CP, palpitations, exertional dyspnea.      Past surgical history is reviewed.           11/25/2024   Pre-Op Questionnaire   Have you ever had a heart attack or stroke? (!) YES CAD   Have you ever had surgery on your heart or blood vessels, such as a stent placement, a coronary artery bypass, or surgery on an artery in your head, neck, heart, or legs? (!) YES CABG 7/2020   Do you have chest pain with activity? No    Do you have a history of heart failure? No   Do you currently have a cold, bronchitis or symptoms of other infection? No    Do you have a cough, shortness of breath, or wheezing? No    Do you or anyone in your family have previous history of blood clots? No    Do you or does anyone in your family have a serious bleeding problem such as prolonged bleeding following surgeries or cuts? No    Have you ever had problems with anemia or been told to take iron pills? No    Have you had any abnormal blood loss such as black, tarry or bloody stools? (!) YES 2+ years ago.  Isolated   Have you ever had a blood transfusion? (!) UNKNOWN    Are you willing to have a blood transfusion if it is medically needed before, during, or after your surgery? Yes    Have you or any of your relatives ever had problems with anesthesia? No    Do you have sleep apnea,  "excessive snoring or daytime drowsiness? No    Do you have any artifical heart valves or other implanted medical devices like a pacemaker, defibrillator, or continuous glucose monitor? No    Do you have artificial joints? No    Are you allergic to latex? No        Patient-reported         Preoperative Review of    reviewed - no record of controlled substances prescribed.        Patient Active Problem List    Diagnosis Date Noted    Primary insomnia 09/07/2023     Priority: Medium    Prediabetes 05/19/2022     Priority: Medium    Essential hypertension 07/17/2020     Priority: Medium    Colostomy in place (H) 09/05/2017     Priority: Medium      Past Medical History:   Diagnosis Date    Cancer (H) 2017    Colon CA    GI bleed     Heart disease     High cholesterol     Hypertension      Past Surgical History:   Procedure Laterality Date    ABDOMEN SURGERY  2017    Colon cancer surgery    BYPASS GRAFT ARTERY CORONARY      \"quadruple\"    CA ANESTH,LOWER ARM SURGERY Left     COLONOSCOPY N/A 09/17/2024    Procedure: COLONOSCOPY, FLEXIBLE, WITH LESION REMOVAL USING SNARE;  Surgeon: Diana Mancilla MD;  Location:  GI     Current Outpatient Medications   Medication Sig Dispense Refill    aspirin (ASA) 81 MG chewable tablet Take 81 mg by mouth daily      atorvastatin (LIPITOR) 40 MG tablet Take 1 tablet (40 mg) by mouth daily. 90 tablet 3    chlorthalidone (HYGROTON) 25 MG tablet Take 1 tablet (25 mg) by mouth daily. 90 tablet 1    clopidogrel (PLAVIX) 75 MG tablet Take 1 tablet (75 mg) by mouth daily. 90 tablet 3    coenzyme Q-10 capsule Take 1 capsule by mouth daily      metoprolol tartrate (LOPRESSOR) 25 MG tablet Take 1 tablet (25 mg) by mouth daily 90 tablet 3    Multiple Vitamin (MULTIVITAMIN ADULT) TABS daily      sildenafil (VIAGRA) 100 MG tablet Take 0.5-1 tablets ( mg) by mouth daily as needed (prior to intercourse) 30 tablet 0    traZODone (DESYREL) 50 MG tablet Take 50 mg by mouth nightly as " "needed      vitamin B complex with vitamin C (VITAMIN  B COMPLEX) tablet Take 1 tablet by mouth twice a week      Vitamin D3 (CHOLECALCIFEROL) 25 mcg (1000 units) tablet Take 1 tablet by mouth twice a week         No Known Allergies     Social History     Tobacco Use    Smoking status: Former     Current packs/day: 0.00     Types: Cigarettes     Quit date: 1980     Years since quittin.9    Smokeless tobacco: Never   Substance Use Topics    Alcohol use: Not Currently       History   Drug Use Unknown             Review of Systems  Constitutional, neuro, ENT, endocrine, pulmonary, cardiac, gastrointestinal, genitourinary, musculoskeletal, integument and psychiatric systems are negative, except as otherwise noted.    Objective    /79 (BP Location: Left arm, Patient Position: Sitting, Cuff Size: Adult Regular)   Pulse 62   Temp 98  F (36.7  C) (Tympanic)   Resp 12   Ht 1.727 m (5' 8\")   Wt 78.2 kg (172 lb 4.8 oz)   SpO2 98%   BMI 26.20 kg/m     Estimated body mass index is 26.2 kg/m  as calculated from the following:    Height as of this encounter: 1.727 m (5' 8\").    Weight as of this encounter: 78.2 kg (172 lb 4.8 oz).  Physical Exam  GENERAL: alert and no distress  NECK: no adenopathy, no asymmetry, masses, or scars  RESP: lungs clear to auscultation - no rales, rhonchi or wheezes  CV: regular rate and rhythm, normal S1 S2, no S3 or S4, no murmur, click or rub, no peripheral edema  ABDOMEN: soft, nontender, no hepatosplenomegaly, no masses and bowel sounds normal  MS: no gross musculoskeletal defects noted, no edema    Recent Labs   Lab Test 24  1808 06/10/24  1405   NA  --  135   POTASSIUM  --  3.6   CR 0.9 0.81   A1C  --  5.8*        Diagnostics  Labs pending at this time.  Results will be reviewed when available.   EKG: appears normal, NSR, normal axis, normal intervals, no acute ST/T changes c/w ischemia, no LVH by voltage criteria, unchanged from previous tracings    Revised Cardiac " Risk Index (RCRI)  The patient has the following serious cardiovascular risks for perioperative complications:   - Coronary Artery Disease (MI, positive stress test, angina, Qs on EKG) = 1 point     RCRI Interpretation: 1 point: Class II (low risk - 0.9% complication rate)         Signed Electronically by: Timur Bustos MD  A copy of this evaluation report is provided to the requesting physician.

## 2024-11-27 LAB
CREAT SERPL-MCNC: 0.85 MG/DL (ref 0.67–1.17)
EGFRCR SERPLBLD CKD-EPI 2021: >90 ML/MIN/1.73M2

## 2024-12-15 ENCOUNTER — HEALTH MAINTENANCE LETTER (OUTPATIENT)
Age: 74
End: 2024-12-15

## 2025-01-02 NOTE — TELEPHONE ENCOUNTER
DIAGNOSIS: (R) foot injury / self referred / Medicare & [facility]    APPOINTMENT DATE: 1/6/25   NOTES STATUS DETAILS   OFFICE NOTE from referring provider Internal Self    MEDICATION LIST Internal

## 2025-01-02 NOTE — PROGRESS NOTES
"ASSESSMENT/PLAN:    (S98.339A) Injury of right foot, initial encounter  (primary encounter diagnosis)  Comment: concerning refractory 5th met pain; x-ray w/ severe 1st MTP OA/bunion but no other bony pathology; discussed further imaging w/ MRI to eval for stress injury vs trial in walking boot; pt elected latter and will wear boot x 2-4 wks; f/up in 3 wks; if no better, will check MRI; if better, would wean from boot to physical therapy; precautions/ anticipatory guidance given  Plan: Ankle/Foot Bracing Supplies Order Walking Boot; Right; Pneumatic; Short (XL)          Jimmy Pemberton MD  January 6, 2025  8:23 PM        Pt is a 74 year old male here today for:     HPI:   R Foot/Ankle pain: hurts when he wakes  Location: lateral foot   Duration:7-8 weeks ago   Trauma/ Fall? YES - inversion injury/fall getting up after prolonged sitting on the floor;    Able to walk? YES  Swelling? YES   Bruising? NO  Numbness/ Tingling? NO   Weakness? No  Instability? No  Snapping/Clicking? No  Imaging? X-ray   Treatment? 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  Moved here 6 months ago from [place] walking for exercise; 45 min to 1 hr    Past Medical History:   Diagnosis Date    Cancer (H) 2017    Colon CA    GI bleed     Heart disease     High cholesterol     Hypertension       Past Surgical History:   Procedure Laterality Date    ABDOMEN SURGERY  2017    Colon cancer surgery    BYPASS GRAFT ARTERY CORONARY      \"quadruple\"    CA ANESTH,LOWER ARM SURGERY Left     COLONOSCOPY N/A 09/17/2024    Procedure: COLONOSCOPY, FLEXIBLE, WITH LESION REMOVAL USING SNARE;  Surgeon: Diana Mancilla MD;  Location:  GI      Current Outpatient Medications   Medication Sig Dispense Refill    aspirin (ASA) 81 MG chewable tablet Take 81 mg by mouth daily      atorvastatin (LIPITOR) 40 MG tablet Take 1 tablet (40 mg) by mouth daily. 90 tablet 3    chlorthalidone (HYGROTON) 25 MG tablet Take 1 tablet (25 mg) by mouth daily. 90 tablet 1    " clopidogrel (PLAVIX) 75 MG tablet Take 1 tablet (75 mg) by mouth daily. 90 tablet 3    coenzyme Q-10 capsule Take 1 capsule by mouth daily      metoprolol tartrate (LOPRESSOR) 25 MG tablet Take 1 tablet (25 mg) by mouth daily 90 tablet 3    Multiple Vitamin (MULTIVITAMIN ADULT) TABS daily      sildenafil (VIAGRA) 100 MG tablet Take 0.5-1 tablets ( mg) by mouth daily as needed (prior to intercourse) 30 tablet 0    traZODone (DESYREL) 50 MG tablet Take 50 mg by mouth nightly as needed      vitamin B complex with vitamin C (VITAMIN  B COMPLEX) tablet Take 1 tablet by mouth twice a week      Vitamin D3 (CHOLECALCIFEROL) 25 mcg (1000 units) tablet Take 1 tablet by mouth twice a week        No Known Allergies   ROS:   Gen- no fevers/chills   Rheum - no morning stiffness   Derm - no rash/ redness   Neuro - no numbness, no tingling   Remainder of ROS negative.     Exam:   There were no vitals taken for this visit.       R Foot/Ankle:   Inspection: Swelling - YES - at 1st MTP; Bruising - no   Sensation: intact in peroneal, tibial, sural, and saphenous distribution   ROM: Dorsiflexion - full; Plantarflexion - full; Inversion -full; Eversion - full;    Strength: 5/5 in all motions; Pain w/ resisted eversion  Bony tenderness: Medial malleolus? - No; Lateral malleolus? - No; Navicular? - No; 5th Metatarsal? - YES - along entire aspect; Other - Brenton  Ligaments Tenderness: None  Tendons: Posterior Tibialis - No; Peroneals - No; Achilles - No   Maneuvers:  Squeeze - Neg; Hop - deferred; Magallon - intact    Xray of R foot on January 6, 2025 at [facility] location - films personally reviewed with patient at time of visit     My impression: severe 1st MTP OA/ bunion deformity

## 2025-01-06 ENCOUNTER — ANCILLARY PROCEDURE (OUTPATIENT)
Dept: GENERAL RADIOLOGY | Facility: CLINIC | Age: 75
End: 2025-01-06
Attending: FAMILY MEDICINE
Payer: MEDICARE

## 2025-01-06 ENCOUNTER — OFFICE VISIT (OUTPATIENT)
Dept: ORTHOPEDICS | Facility: CLINIC | Age: 75
End: 2025-01-06
Payer: MEDICARE

## 2025-01-06 ENCOUNTER — PRE VISIT (OUTPATIENT)
Dept: FAMILY MEDICINE | Facility: CLINIC | Age: 75
End: 2025-01-06

## 2025-01-06 DIAGNOSIS — S99.921A INJURY OF RIGHT FOOT, INITIAL ENCOUNTER: Primary | ICD-10-CM

## 2025-01-06 DIAGNOSIS — S99.921A INJURY OF RIGHT FOOT, INITIAL ENCOUNTER: ICD-10-CM

## 2025-01-06 LAB — RADIOLOGIST FLAGS: ABNORMAL

## 2025-01-06 PROCEDURE — 73630 X-RAY EXAM OF FOOT: CPT | Mod: RT | Performed by: RADIOLOGY

## 2025-01-06 PROCEDURE — 99203 OFFICE O/P NEW LOW 30 MIN: CPT | Performed by: FAMILY MEDICINE

## 2025-01-06 NOTE — LETTER
"  1/6/2025      RE: James Kebede  [address] [address]  [facility] [address]     Dear Colleague,    Thank you for referring your patient, James Kebede, to the [facility] [place]. Please see a copy of my visit note below.    ASSESSMENT/PLAN:    (A14.346H) Injury of right foot, initial encounter  (primary encounter diagnosis)  Comment: concerning refractory 5th met pain; x-ray w/ severe 1st MTP OA/bunion but no other bony pathology; discussed further imaging w/ MRI to eval for stress injury vs trial in walking boot; pt elected latter and will wear boot x 2-4 wks; f/up in 3 wks; if no better, will check MRI; if better, would wean from boot to physical therapy; precautions/ anticipatory guidance given  Plan: Ankle/Foot Bracing Supplies Order Walking Boot; Right; Pneumatic; Short (XL)          Jimmy Pemberton MD  January 6, 2025  8:23 PM        Pt is a 74 year old male here today for:     HPI:   R Foot/Ankle pain: hurts when he wakes  Location: lateral foot   Duration:7-8 weeks ago   Trauma/ Fall? YES - inversion injury/fall getting up after prolonged sitting on the floor;    Able to walk? YES  Swelling? YES   Bruising? NO  Numbness/ Tingling? NO   Weakness? No  Instability? No  Snapping/Clicking? No  Imaging? X-ray   Treatment? 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  Moved here 6 months ago from [facility] walking for exercise; 45 min to 1 hr    Past Medical History:   Diagnosis Date     Cancer (H) 2017    Colon CA     GI bleed      Heart disease      High cholesterol      Hypertension       Past Surgical History:   Procedure Laterality Date     ABDOMEN SURGERY  2017    Colon cancer surgery     BYPASS GRAFT ARTERY CORONARY      \"quadruple\"     CA ANESTH,LOWER ARM SURGERY Left      COLONOSCOPY N/A 09/17/2024    Procedure: COLONOSCOPY, FLEXIBLE, WITH LESION REMOVAL USING SNARE;  Surgeon: Diana Mancilla MD;  Location:  GI      Current Outpatient Medications   Medication Sig " Dispense Refill     aspirin (ASA) 81 MG chewable tablet Take 81 mg by mouth daily       atorvastatin (LIPITOR) 40 MG tablet Take 1 tablet (40 mg) by mouth daily. 90 tablet 3     chlorthalidone (HYGROTON) 25 MG tablet Take 1 tablet (25 mg) by mouth daily. 90 tablet 1     clopidogrel (PLAVIX) 75 MG tablet Take 1 tablet (75 mg) by mouth daily. 90 tablet 3     coenzyme Q-10 capsule Take 1 capsule by mouth daily       metoprolol tartrate (LOPRESSOR) 25 MG tablet Take 1 tablet (25 mg) by mouth daily 90 tablet 3     Multiple Vitamin (MULTIVITAMIN ADULT) TABS daily       sildenafil (VIAGRA) 100 MG tablet Take 0.5-1 tablets ( mg) by mouth daily as needed (prior to intercourse) 30 tablet 0     traZODone (DESYREL) 50 MG tablet Take 50 mg by mouth nightly as needed       vitamin B complex with vitamin C (VITAMIN  B COMPLEX) tablet Take 1 tablet by mouth twice a week       Vitamin D3 (CHOLECALCIFEROL) 25 mcg (1000 units) tablet Take 1 tablet by mouth twice a week        No Known Allergies   ROS:   Gen- no fevers/chills   Rheum - no morning stiffness   Derm - no rash/ redness   Neuro - no numbness, no tingling   Remainder of ROS negative.     Exam:   There were no vitals taken for this visit.       R Foot/Ankle:   Inspection: Swelling - YES - at 1st MTP; Bruising - no   Sensation: intact in peroneal, tibial, sural, and saphenous distribution   ROM: Dorsiflexion - full; Plantarflexion - full; Inversion -full; Eversion - full;    Strength: 5/5 in all motions; Pain w/ resisted eversion  Bony tenderness: Medial malleolus? - No; Lateral malleolus? - No; Navicular? - No; 5th Metatarsal? - YES - along entire aspect; Other - Brenton  Ligaments Tenderness: None  Tendons: Posterior Tibialis - No; Peroneals - No; Achilles - No   Maneuvers:  Squeeze - Neg; Hop - deferred; Magallon - intact    Xray of R foot on January 6, 2025 at [facility] location - films personally reviewed with patient at time of visit     My impression: severe 1st MTP OA/  bunion deformity       DME FITTING    Relevant Diagnosis: Right foot pain  Short walking boot, XL brace was fit on patient's Right foot.     Person(s) involved in teaching:   Patient    Brace was applied in standard Manner:  Yes  Brace fit well:  Yes  Patient reports brace to fit comfortably:  Yes    Education:   Patient shown self application and removal of brace: Yes  Patient shown how to adjust brace fit, if necessary: Yes  Patient educated on billing and return policy: Yes  Patient confirmed understanding when and how to contact clinic with concerns: Yes    Bogdan Osuna M.A., LAT, ATC  Certified Athletic Trainer      Again, thank you for allowing me to participate in the care of your patient.      Sincerely,    Jimmy Pemberton MD

## 2025-01-06 NOTE — PROGRESS NOTES
DME FITTING    Relevant Diagnosis: Right foot pain  Short walking boot, XL brace was fit on patient's Right foot.     Person(s) involved in teaching:   Patient    Brace was applied in standard Manner:  Yes  Brace fit well:  Yes  Patient reports brace to fit comfortably:  Yes    Education:   Patient shown self application and removal of brace: Yes  Patient shown how to adjust brace fit, if necessary: Yes  Patient educated on billing and return policy: Yes  Patient confirmed understanding when and how to contact clinic with concerns: Yes    Bogdan Osuna M.A., LAT, ATC  Certified Athletic Trainer

## 2025-01-07 NOTE — RESULT ENCOUNTER NOTE
Reviewed at time of visit. No clinical correlation to Lisfranc injury at visit today.    Jimmy Pemberton MD  1/6/2025  8:32 PM

## 2025-01-23 ENCOUNTER — TELEPHONE (OUTPATIENT)
Dept: ORTHOPEDICS | Facility: CLINIC | Age: 75
End: 2025-01-23
Payer: MEDICARE

## 2025-01-27 ENCOUNTER — OFFICE VISIT (OUTPATIENT)
Dept: ORTHOPEDICS | Facility: CLINIC | Age: 75
End: 2025-01-27
Attending: FAMILY MEDICINE
Payer: MEDICARE

## 2025-01-27 DIAGNOSIS — M79.671 RIGHT FOOT PAIN: Primary | ICD-10-CM

## 2025-01-27 PROCEDURE — 99213 OFFICE O/P EST LOW 20 MIN: CPT | Mod: GC | Performed by: FAMILY MEDICINE

## 2025-01-27 NOTE — PROGRESS NOTES
ASSESSMENT/PLAN:    (M79.671) Right foot pain  (primary encounter diagnosis)  Comment:   Plan: still w/ mild bony tenderness mid shaft of 5th met; will remain in boot 1-3 additional weeks (max 6 weeks) til this pain subsides and wean out of boot; if no better in 3 wks, he can contact me and I will order an MRI; precautions given    Attestation:  This patient has been seen and evaluated by me, Jimmy Pemberton MD with the resident, Dr Canchola and the care team. I agree with the findings and plan of care as documented in this note.    Jimmy Pemberton MD  January 27, 2025  9:55 PM        Pt is a 74 year old male last seen on 1/6/25 here for follow up of:     R foot - Boot x 3 wks; Patient reports that his pain has reduced, it is still present but not as bad. Pain is located lateral foot. He has been compliant with wearing the boot.   Pain down from 9/10 to 2-3/10      Per my last note:  (S99.921A) Injury of right foot, initial encounter  (primary encounter diagnosis)  Comment: concerning refractory 5th met pain; x-ray w/ severe 1st MTP OA/bunion but no other bony pathology; discussed further imaging w/ MRI to eval for stress injury vs trial in walking boot; pt elected latter and will wear boot x 2-4 wks; f/up in 3 wks; if no better, will check MRI; if better, would wean from boot to physical therapy; precautions/ anticipatory guidance given  Plan: Ankle/Foot Bracing Supplies Order Walking Boot; Right; Pneumatic; Short (XL)    Past Medical History:   Diagnosis Date    Cancer (H) 2017    Colon CA    GI bleed     Heart disease     High cholesterol     Hypertension       Current Outpatient Medications   Medication Sig Dispense Refill    aspirin (ASA) 81 MG chewable tablet Take 81 mg by mouth daily      atorvastatin (LIPITOR) 40 MG tablet Take 1 tablet (40 mg) by mouth daily. 90 tablet 3    chlorthalidone (HYGROTON) 25 MG tablet Take 1 tablet (25 mg) by mouth daily. 90 tablet 1    clopidogrel (PLAVIX) 75 MG tablet Take 1 tablet (75  mg) by mouth daily. 90 tablet 3    coenzyme Q-10 capsule Take 1 capsule by mouth daily      metoprolol tartrate (LOPRESSOR) 25 MG tablet Take 1 tablet (25 mg) by mouth daily 90 tablet 3    Multiple Vitamin (MULTIVITAMIN ADULT) TABS daily      sildenafil (VIAGRA) 100 MG tablet Take 0.5-1 tablets ( mg) by mouth daily as needed (prior to intercourse) 30 tablet 0    traZODone (DESYREL) 50 MG tablet Take 50 mg by mouth nightly as needed      vitamin B complex with vitamin C (VITAMIN  B COMPLEX) tablet Take 1 tablet by mouth twice a week      Vitamin D3 (CHOLECALCIFEROL) 25 mcg (1000 units) tablet Take 1 tablet by mouth twice a week        No Known Allergies     ROS:   Gen- no fevers/chills   Rheum - no morning stiffness   Derm - no rash/ redness   Neuro - no numbness, no tingling   Remainder of ROS negative.     Exam:   R foot:  Physical Exam  Musculoskeletal:      Right foot: Normal range of motion. Bunion and bony tenderness present. No swelling, deformity, foot drop or tenderness. Normal pulse.      Left foot: Normal.      Comments: Slight tenderness over proximal 5th metatarsal on lateral/plantar surface, no additional bony or soft tissue tenderness. Flexion, extension, IR, ER without deficit. Strength 5/5 in all fields tested including plantarflexion, dorsiflexion, inversion and eversion.

## 2025-01-27 NOTE — LETTER
1/27/2025      RE: James Kebede  [address] [address]  [facility] [address]     Dear Colleague,    Thank you for referring your patient, James Kebede, to the [facility] [place]. Please see a copy of my visit note below.    ASSESSMENT/PLAN:    (M95.135) Right foot pain  (primary encounter diagnosis)  Comment:   Plan: still w/ mild bony tenderness mid shaft of 5th met; will remain in boot 1-3 additional weeks (max 6 weeks) til this pain subsides and wean out of boot; if no better in 3 wks, he can contact me and I will order an MRI; precautions given    Attestation:  This patient has been seen and evaluated by me, Jimmy Pemberton MD with the resident, Dr Canchola and the care team. I agree with the findings and plan of care as documented in this note.    Jimmy Pemberton MD  January 27, 2025  9:55 PM        Pt is a 74 year old male last seen on 1/6/25 here for follow up of:     R foot - Boot x 3 wks; Patient reports that his pain has reduced, it is still present but not as bad. Pain is located lateral foot. He has been compliant with wearing the boot.   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 but no other bony pathology; discussed further imaging w/ MRI to eval for stress injury vs trial in walking boot; pt elected latter and will wear boot x 2-4 wks; f/up in 3 wks; if no better, will check MRI; if better, would wean from boot to physical therapy; precautions/ anticipatory guidance given  Plan: Ankle/Foot Bracing Supplies Order Walking Boot; Right; Pneumatic; Short (XL)    Past Medical History:   Diagnosis Date     Cancer (H) 2017    Colon CA     GI bleed      Heart disease      High cholesterol      Hypertension       Current Outpatient Medications   Medication Sig Dispense Refill     aspirin (ASA) 81 MG chewable tablet  Take 81 mg by mouth daily       atorvastatin (LIPITOR) 40 MG tablet Take 1 tablet (40 mg) by mouth daily. 90 tablet 3     chlorthalidone (HYGROTON) 25 MG tablet Take 1 tablet (25 mg) by mouth daily. 90 tablet 1     clopidogrel (PLAVIX) 75 MG tablet Take 1 tablet (75 mg) by mouth daily. 90 tablet 3     coenzyme Q-10 capsule Take 1 capsule by mouth daily       metoprolol tartrate (LOPRESSOR) 25 MG tablet Take 1 tablet (25 mg) by mouth daily 90 tablet 3     Multiple Vitamin (MULTIVITAMIN ADULT) TABS daily       sildenafil (VIAGRA) 100 MG tablet Take 0.5-1 tablets ( mg) by mouth daily as needed (prior to intercourse) 30 tablet 0     traZODone (DESYREL) 50 MG tablet Take 50 mg by mouth nightly as needed       vitamin B complex with vitamin C (VITAMIN  B COMPLEX) tablet Take 1 tablet by mouth twice a week       Vitamin D3 (CHOLECALCIFEROL) 25 mcg (1000 units) tablet Take 1 tablet by mouth twice a week        No Known Allergies     ROS:   Gen- no fevers/chills   Rheum - no morning stiffness   Derm - no rash/ redness   Neuro - no numbness, no tingling   Remainder of ROS negative.     Exam:   R foot:  Physical Exam  Musculoskeletal:      Right foot: Normal range of motion. Bunion and bony tenderness present. No swelling, deformity, foot drop or tenderness. Normal pulse.      Left foot: Normal.      Comments: Slight tenderness over proximal 5th metatarsal on lateral/plantar surface, no additional bony or soft tissue tenderness. Flexion, extension, IR, ER without deficit. Strength 5/5 in all fields tested including plantarflexion, dorsiflexion, inversion and eversion.             Again, thank you for allowing me to participate in the care of your patient.      Sincerely,    Jimmy Pemberton MD

## 2025-02-10 DIAGNOSIS — I25.10 CORONARY ARTERY DISEASE INVOLVING NATIVE CORONARY ARTERY OF NATIVE HEART WITHOUT ANGINA PECTORIS: ICD-10-CM

## 2025-02-10 RX ORDER — CHLORTHALIDONE 25 MG/1
25 TABLET ORAL DAILY
Qty: 90 TABLET | Refills: 1 | OUTPATIENT
Start: 2025-02-10

## 2025-03-16 ENCOUNTER — HEALTH MAINTENANCE LETTER (OUTPATIENT)
Age: 75
End: 2025-03-16

## 2025-04-23 ENCOUNTER — HOSPITAL ENCOUNTER (OUTPATIENT)
Dept: CT IMAGING | Facility: CLINIC | Age: 75
Discharge: HOME | End: 2025-04-23
Payer: MEDICARE

## 2025-04-23 DIAGNOSIS — Z85.038 PERSONAL HISTORY OF COLON CANCER: ICD-10-CM

## 2025-04-23 DIAGNOSIS — D49.0 NEOPLASM OF APPENDIX: ICD-10-CM

## 2025-04-23 LAB
CREAT BLD-MCNC: 0.9 MG/DL (ref 0.7–1.2)
EGFRCR SERPLBLD CKD-EPI 2021: >60 ML/MIN/1.73M2

## 2025-04-23 PROCEDURE — 250N000011 HC RX IP 250 OP 636

## 2025-04-23 PROCEDURE — 71260 CT THORAX DX C+: CPT

## 2025-04-23 PROCEDURE — 82565 ASSAY OF CREATININE: CPT

## 2025-04-23 PROCEDURE — 250N000009 HC RX 250

## 2025-04-23 RX ORDER — IOPAMIDOL 755 MG/ML
84 INJECTION, SOLUTION INTRAVASCULAR ONCE
Status: COMPLETED | OUTPATIENT
Start: 2025-04-23 | End: 2025-04-23

## 2025-04-23 RX ADMIN — IOPAMIDOL 84 ML: 755 INJECTION, SOLUTION INTRAVENOUS at 14:30

## 2025-04-23 RX ADMIN — SODIUM CHLORIDE 64 ML: 9 INJECTION, SOLUTION INTRAVENOUS at 14:30

## 2025-05-17 DIAGNOSIS — I25.10 CORONARY ARTERY DISEASE INVOLVING NATIVE CORONARY ARTERY OF NATIVE HEART WITHOUT ANGINA PECTORIS: ICD-10-CM

## 2025-05-19 RX ORDER — CHLORTHALIDONE 25 MG/1
25 TABLET ORAL DAILY
Qty: 90 TABLET | Refills: 1 | Status: SHIPPED | OUTPATIENT
Start: 2025-05-19

## (undated) DEVICE — ENDO FORCEP ENDOJAW BIOPSY 2.8MMX230CM FB-220U